# Patient Record
Sex: MALE | Race: BLACK OR AFRICAN AMERICAN | NOT HISPANIC OR LATINO | Employment: OTHER | URBAN - METROPOLITAN AREA
[De-identification: names, ages, dates, MRNs, and addresses within clinical notes are randomized per-mention and may not be internally consistent; named-entity substitution may affect disease eponyms.]

---

## 2017-03-08 ENCOUNTER — TRANSFERRED RECORDS (OUTPATIENT)
Dept: HEALTH INFORMATION MANAGEMENT | Facility: CLINIC | Age: 67
End: 2017-03-08

## 2017-03-16 ENCOUNTER — TRANSFERRED RECORDS (OUTPATIENT)
Dept: HEALTH INFORMATION MANAGEMENT | Facility: CLINIC | Age: 67
End: 2017-03-16

## 2017-03-20 ENCOUNTER — TRANSFERRED RECORDS (OUTPATIENT)
Dept: HEALTH INFORMATION MANAGEMENT | Facility: CLINIC | Age: 67
End: 2017-03-20

## 2017-08-29 ENCOUNTER — OFFICE VISIT (OUTPATIENT)
Dept: FAMILY MEDICINE | Facility: CLINIC | Age: 67
End: 2017-08-29
Payer: COMMERCIAL

## 2017-08-29 VITALS
DIASTOLIC BLOOD PRESSURE: 70 MMHG | HEIGHT: 70 IN | SYSTOLIC BLOOD PRESSURE: 129 MMHG | OXYGEN SATURATION: 98 % | WEIGHT: 187 LBS | BODY MASS INDEX: 26.77 KG/M2 | TEMPERATURE: 97.6 F | HEART RATE: 94 BPM

## 2017-08-29 DIAGNOSIS — Z00.00 ROUTINE GENERAL MEDICAL EXAMINATION AT A HEALTH CARE FACILITY: Primary | ICD-10-CM

## 2017-08-29 DIAGNOSIS — R21 RASH: ICD-10-CM

## 2017-08-29 DIAGNOSIS — I10 HYPERTENSION GOAL BP (BLOOD PRESSURE) < 140/90: ICD-10-CM

## 2017-08-29 DIAGNOSIS — B19.20 HEPATITIS C VIRUS INFECTION WITHOUT HEPATIC COMA, UNSPECIFIED CHRONICITY: ICD-10-CM

## 2017-08-29 LAB
ALBUMIN SERPL-MCNC: 3.6 G/DL (ref 3.4–5)
ALP SERPL-CCNC: 101 U/L (ref 40–150)
ALT SERPL W P-5'-P-CCNC: 24 U/L (ref 0–70)
ANION GAP SERPL CALCULATED.3IONS-SCNC: 8 MMOL/L (ref 3–14)
AST SERPL W P-5'-P-CCNC: 23 U/L (ref 0–45)
BILIRUB DIRECT SERPL-MCNC: <0.1 MG/DL (ref 0–0.2)
BILIRUB SERPL-MCNC: 0.3 MG/DL (ref 0.2–1.3)
BUN SERPL-MCNC: 13 MG/DL (ref 7–30)
CALCIUM SERPL-MCNC: 9.6 MG/DL (ref 8.5–10.1)
CHLORIDE SERPL-SCNC: 106 MMOL/L (ref 94–109)
CO2 SERPL-SCNC: 25 MMOL/L (ref 20–32)
CREAT SERPL-MCNC: 0.87 MG/DL (ref 0.66–1.25)
GFR SERPL CREATININE-BSD FRML MDRD: 87 ML/MIN/1.7M2
GLUCOSE SERPL-MCNC: 88 MG/DL (ref 70–99)
POTASSIUM SERPL-SCNC: 3.8 MMOL/L (ref 3.4–5.3)
PROT SERPL-MCNC: 7.7 G/DL (ref 6.8–8.8)
SODIUM SERPL-SCNC: 139 MMOL/L (ref 133–144)

## 2017-08-29 PROCEDURE — 80048 BASIC METABOLIC PNL TOTAL CA: CPT | Performed by: NURSE PRACTITIONER

## 2017-08-29 PROCEDURE — 99213 OFFICE O/P EST LOW 20 MIN: CPT | Mod: 25 | Performed by: NURSE PRACTITIONER

## 2017-08-29 PROCEDURE — 36415 COLL VENOUS BLD VENIPUNCTURE: CPT | Performed by: NURSE PRACTITIONER

## 2017-08-29 PROCEDURE — 87522 HEPATITIS C REVRS TRNSCRPJ: CPT | Performed by: NURSE PRACTITIONER

## 2017-08-29 PROCEDURE — 87529 HSV DNA AMP PROBE: CPT | Performed by: NURSE PRACTITIONER

## 2017-08-29 PROCEDURE — 86803 HEPATITIS C AB TEST: CPT | Performed by: NURSE PRACTITIONER

## 2017-08-29 PROCEDURE — G0438 PPPS, INITIAL VISIT: HCPCS | Performed by: NURSE PRACTITIONER

## 2017-08-29 PROCEDURE — 80076 HEPATIC FUNCTION PANEL: CPT | Performed by: NURSE PRACTITIONER

## 2017-08-29 PROCEDURE — 86706 HEP B SURFACE ANTIBODY: CPT | Performed by: NURSE PRACTITIONER

## 2017-08-29 PROCEDURE — 86704 HEP B CORE ANTIBODY TOTAL: CPT | Performed by: NURSE PRACTITIONER

## 2017-08-29 PROCEDURE — 87340 HEPATITIS B SURFACE AG IA: CPT | Performed by: NURSE PRACTITIONER

## 2017-08-29 RX ORDER — LISINOPRIL/HYDROCHLOROTHIAZIDE 10-12.5 MG
1 TABLET ORAL DAILY
Qty: 90 TABLET | Refills: 3 | Status: SHIPPED | OUTPATIENT
Start: 2017-08-29 | End: 2018-09-07

## 2017-08-29 ASSESSMENT — PAIN SCALES - GENERAL: PAINLEVEL: NO PAIN (0)

## 2017-08-29 NOTE — MR AVS SNAPSHOT
After Visit Summary   8/29/2017    Kapil Auguste    MRN: [id number]           Patient Information     Date Of Birth          1950        Visit Information        Provider Department      8/29/2017 10:20 AM Shadia Foote APRN [facility]        Today's Diagnoses     Routine general medical examination at a health care facility    -  1    Rash        Hypertension goal BP (blood pressure) < 140/90        Hepatitis C virus infection without hepatic coma, unspecified chronicity          Care Instructions      Preventive Health Recommendations:       Male Ages 65 and over    Yearly exam:             See your health care provider every year in order to  o   Review health changes.   o   Discuss preventive care.    o   Review your medicines if your doctor has prescribed any.    Talk with your health care provider about whether you should have a test to screen for prostate cancer (PSA).    Every 3 years, have a diabetes test (fasting glucose). If you are at risk for diabetes, you should have this test more often.    Every 5 years, have a cholesterol test. Have this test more often if you are at risk for high cholesterol or heart disease.     Every 10 years, have a colonoscopy. Or, have a yearly FIT test (stool test). These exams will check for colon cancer.    Talk to with your health care provider about screening for Abdominal Aortic Aneurysm if you have a family history of AAA or have a history of smoking.  Shots:     Get a flu shot each year.     Get a tetanus shot every 10 years.     Talk to your doctor about your pneumonia vaccines. There are now two you should receive - Pneumovax (PPSV 23) and Prevnar (PCV 13).    Talk to your doctor about a shingles vaccine.     Talk to your doctor about the hepatitis B vaccine.  Nutrition:     Eat at least 5 servings of fruits and vegetables each day.     Eat whole-grain bread, whole-wheat pasta and brown rice instead of  "white grains and rice.     Talk to your doctor about Calcium and Vitamin D.   Lifestyle    Exercise for at least 150 minutes a week (30 minutes a day, 5 days a week). This will help you control your weight and prevent disease.     Limit alcohol to one drink per day.     No smoking.     Wear sunscreen to prevent skin cancer.     See your dentist every six months for an exam and cleaning.     See your eye doctor every 1 to 2 years to screen for conditions such as glaucoma, macular degeneration and cataracts.          Follow-ups after your visit        Who to contact     If you have questions or need follow up information about today's clinic visit or your schedule please contact [facility] directly at [phone].  Normal or non-critical lab and imaging results will be communicated to you by MyChart, letter or phone within 4 business days after the clinic has received the results. If you do not hear from us within 7 days, please contact the clinic through [employer] or phone. If you have a critical or abnormal lab result, we will notify you by phone as soon as possible.  Submit refill requests through [employer] or call your pharmacy and they will forward the refill request to us. Please allow 3 business days for your refill to be completed.          Additional Information About Your Visit        [employer] ? Information     [employer] lets you send messages to your doctor, view your test results, renew your prescriptions, schedule appointments and more. To sign up, go to [email or web address] . Click on \"Log in\" on the left side of the screen, which will take you to the Welcome page. Then click on \"Sign up Now\" on the right side of the page.     You will be asked to enter the access code listed below, as well as some personal information. Please follow the directions to create your username and password.     Your access code is: [email or web address]  Expires: 2017 11:05 AM     Your access code will  " "in 90 days. If you need help or a new code, please call your [facility] clinic or [phone].        Care EveryWhere ID     This is your Care EveryWhere ID. This could be used by other organizations to access your [facility] medical records  [id number]        Your Vitals Were     Pulse Temperature Height Pulse Oximetry BMI (Body Mass Index)       94 97.6  F (36.4  C) (Oral) 5' 9.5\" (1.765 m) 98% 27.22 kg/m2        Blood Pressure from Last 3 Encounters:   08/29/17 129/70   09/26/11 140/100   09/19/11 153/90    Weight from Last 3 Encounters:   08/29/17 187 lb (84.8 kg)   09/26/11 181 lb (82.1 kg)   09/19/11 182 lb (82.6 kg)              We Performed the Following     Basic metabolic panel     Hepatitis B core antibody     Hepatitis B Surface Antibody     Hepatitis B surface antigen     Hepatitis C antibody     HSV 1 and 2 DNA by PCR          Where to get your medicines      These medications were sent to [facility] - [place], [place] - [address]  [address], [facility] [address]     Phone:  [phone]     lisinopril-hydrochlorothiazide 10-12.5 MG per tablet          Primary Care Provider Office Phone #    Cathie Fletcher [phone]       XX NO INFO FOUND XX  [place] [address]        Equal Access to Services     JOSE ANGEL LEBRON AH: Hadii aad ku hadasho Soomaali, waaxda luqadaha, qaybta kaalmada shakiregyada, etienne barillas. So [place] [phone].    ATENCIÓN: Si habla español, tiene a king disposición servicios gratuitos de asistencia lingüística. David al [phone].    We comply with applicable federal civil rights laws and [place] laws. We do not discriminate on the basis of race, color, national origin, age, disability sex, sexual orientation or gender identity.            Thank you!     Thank you for choosing [facility]  for your care. Our goal is always to provide you with excellent care. Hearing back from " our patients is one way we can continue to improve our services. Please take a few minutes to complete the written survey that you may receive in the mail after your visit with us. Thank you!             Your Updated Medication List - Protect others around you: Learn how to safely use, store and throw away your medicines at www.disposemymeds.org.          This list is accurate as of: 8/29/17 11:05 AM.  Always use your most recent med list.                   Brand Name Dispense Instructions for use Diagnosis    calcium-vitamin D 600-400 MG-UNIT per tablet    calcium 600 + D    100 tablet    Take 1 tablet by mouth 2 times daily.    Routine general medical examination at a health care facility       CLARITIN 10 MG capsule   Generic drug:  loratadine      Take 10 mg by mouth daily. As needed        ibuprofen 200 MG tablet    ADVIL/MOTRIN     Take 200 mg by mouth every 4 hours as needed. As needed        lisinopril-hydrochlorothiazide 10-12.5 MG per tablet    PRINZIDE/ZESTORETIC    90 tablet    Take 1 tablet by mouth daily    Hypertension goal BP (blood pressure) < 140/90       NYQUIL 60-7.5- MG/30ML Liqd   Generic drug:  Pseudoeph-Doxylamine-DM-APAP      Take  by mouth. As needed        order for DME     1 each    BP monitor device    Hypertension goal BP (blood pressure) < 140/90

## 2017-08-29 NOTE — Clinical Note
Please abstract the following data from this visit with this patient into the appropriate field in Epic: and please update HM and remove FIT testing. Thanks  Colonoscopy done on this date: 08/2013 (approximately), by this group: [facility], results were normal, repeat in 10 years.

## 2017-08-29 NOTE — Clinical Note
Please abstract the following data from this visit with this patient into the appropriate field in Epic:  Colonoscopy done on this date: 08/2013 (approximately), by this group: [facility], results were normal, repeat in 10 years.

## 2017-08-29 NOTE — PROGRESS NOTES
SUBJECTIVE:   Kapil Auguste is a 67 year old male who presents for Preventive Visit.      Are you in the first 12 months of your Medicare coverage?  No    Physical   Annual:     Getting at least 3 servings of Calcium per day::  Yes    Bi-annual eye exam::  Yes    Dental care twice a year::  Yes    Sleep apnea or symptoms of sleep apnea::  None    Diet::  Regular (no restrictions)    Frequency of exercise::  6-7 days/week    Duration of exercise::  Greater than 60 minutes    Taking medications regularly::  Yes    Medication side effects::  None    Additional concerns today::  No      COGNITIVE SCREEN  1) Repeat 3 items (Banana, Sunrise, Chair)    2) Clock draw: ABNORMAL   3) 3 item recall: Recalls 1 object   Results: ABNORMAL clock, 1-2 items recalled: PROBABLE COGNITIVE IMPAIRMENT, **INFORM PROVIDER**    Mini-CogTM Copyright S Yaakov. Licensed by the author for use in [facility]; reprinted with permission (summer@[facility]). All rights reserved.        Denies any concerns about his memory  No family history dementia    Wants testing for herpes  Reports he was seen at an outside clinic and prescribed over the counter treatment for athletes foot for a rash he had on his foot  The rash resolved  Declines other STD testing    Transferring from [facility]    Reports he had hep C in the . Never treated  Denies nausea, vomiting, fever, weight loss, night sweats    Exercises regularly and tolerates without chest pain, palpitations, SOB    Reviewed and updated as needed this visit by clinical staff      Reviewed and updated as needed this visit by Provider      Social History   Substance Use Topics     Smoking status: Former Smoker     Types: Cigarettes     Quit date: 1/26/2011     Smokeless tobacco: Never Used     Alcohol use Yes      Comment: occ.       The patient does not drink >3 drinks per day nor >7 drinks per week.      Today's PHQ-2 Score: PHQ-2 ( 1999 Pfizer) 8/29/2017   Q1:  Little interest or pleasure in doing things 0   Q2: Feeling down, depressed or hopeless 0   PHQ-2 Score 0   Q1: Little interest or pleasure in doing things Not at all   Q2: Feeling down, depressed or hopeless Not at all   PHQ-2 Score 0       Do you feel safe in your environment - Yes    Do you have a Health Care Directive?: Yes: Advance Directive has been received and scanned.    Current providers sharing in care for this patient include:   Patient Care Team:  Cathie Fletcher MD as PCP - General (Family Practice)      Hearing impairment: No    Ability to successfully perform activities of daily living: Yes, no assistance needed     Fall risk:  Fallen 2 or more times in the past year?: No  Any fall with injury in the past year?: No      Home safety:  lack of grab bars in the bathroom  click delete button to remove this line now    The following health maintenance items are reviewed in Epic and correct as of today:Health Maintenance   Topic Date Due     HEPATITIS C SCREENING  07/28/1966     MICROALBUMIN Q1 YEAR  09/26/2012     FIT Q1 YR  09/28/2012     FALL RISK ASSESSMENT  07/28/2013     AORTIC ANEURYSM SCREENING (SYSTEM ASSIGNED)  07/28/2013     PNEUMOCOCCAL (2 of 2 - PCV13) 05/08/2015     ADVANCE DIRECTIVE PLANNING Q5 YRS  09/19/2016     LIPID SCREEN Q5 YR MALE (SYSTEM ASSIGNED)  09/26/2016     INFLUENZA VACCINE (SYSTEM ASSIGNED)  09/01/2017     TETANUS IMMUNIZATION (SYSTEM ASSIGNED)  05/08/2024     BP Readings from Last 3 Encounters:   08/29/17 129/70   09/26/11 140/100   09/19/11 153/90    Wt Readings from Last 3 Encounters:   08/29/17 187 lb (84.8 kg)   09/26/11 181 lb (82.1 kg)   09/19/11 182 lb (82.6 kg)                  Patient Active Problem List   Diagnosis     Advanced directives, counseling/discussion     Adjustment disorder     CARDIOVASCULAR SCREENING; LDL GOAL LESS THAN 130     Hypertension goal BP (blood pressure) < 140/90     History reviewed. No pertinent surgical history.    Social History  "  Substance Use Topics     Smoking status: Current Some Day Smoker     Types: Cigarettes     Last attempt to quit: 1/26/2011     Smokeless tobacco: Never Used     Alcohol use Yes      Comment: occ.     Family History   Problem Relation Age of Onset     DIABETES Mother      Hypertension Mother      HEART DISEASE Father      Hypertension Father      CANCER Paternal Uncle      pamcreatic           ROS:  Constitutional, HEENT, cardiovascular, pulmonary, GI, , musculoskeletal, neuro, skin, endocrine and psych systems are negative, except as otherwise noted.      OBJECTIVE:   /70 (BP Location: Right arm, Patient Position: Chair, Cuff Size: Adult Regular)  Pulse 94  Temp 97.6  F (36.4  C) (Oral)  Ht 5' 9.5\" (1.765 m)  Wt 187 lb (84.8 kg)  SpO2 98%  BMI 27.22 kg/m2 Estimated body mass index is 26.16 kg/(m^2) as calculated from the following:    Height as of 9/26/11: 5' 9.75\" (1.772 m).    Weight as of 9/26/11: 181 lb (82.1 kg).  EXAM:   GENERAL: healthy, alert and no distress  EYES: Eyes grossly normal to inspection, PERRL and conjunctivae and sclerae normal  HENT: ear canals and TM's normal, nose and mouth without ulcers or lesions  NECK: no adenopathy, no asymmetry, masses, or scars and thyroid normal to palpation  RESP: lungs clear to auscultation - no rales, rhonchi or wheezes  CV: regular rate and rhythm, normal S1 S2, no S3 or S4, no murmur, click or rub, no peripheral edema and peripheral pulses strong  ABDOMEN: soft, nontender, no hepatosplenomegaly, no masses and bowel sounds normal  MS: no gross musculoskeletal defects noted, no edema  SKIN: no suspicious lesions or rashes  NEURO: Normal strength and tone, mentation intact and speech normal  PSYCH: mentation appears normal, affect normal/bright    ASSESSMENT / PLAN:   1. Routine general medical examination at a health care facility    2. Rash  - Rash has resolved. Will do blood test for HSV. He declined further STD testing. He denies any known " "exposure to HSV  - HSV 1 and 2 DNA by PCR    3. Hypertension goal BP (blood pressure) < 140/90  - Well controlled. Continue with current medication  - lisinopril-hydrochlorothiazide (PRINZIDE/ZESTORETIC) 10-12.5 MG per tablet; Take 1 tablet by mouth daily  Dispense: 90 tablet; Refill: 3  - Basic metabolic panel    4. Hepatitis C virus infection without hepatic coma, unspecified chronicity  - hepatic panel  - Hepatitis C antibody  - Hepatitis B Surface Antibody  - Hepatitis B core antibody  - Hepatitis B surface antigen  - Follow up pending lab results. May need refer to GI    End of Life Planning:  Patient currently has an advanced directive: Yes.  Practitioner is supportive of decision.    COUNSELING:  Reviewed preventive health counseling, as reflected in patient instructions       Regular exercise       Healthy diet/nutrition       Vision screening       Safe sex practices/STD prevention       Colon cancer screening       Prostate cancer screening        Estimated body mass index is 26.16 kg/(m^2) as calculated from the following:    Height as of 9/26/11: 5' 9.75\" (1.772 m).    Weight as of 9/26/11: 181 lb (82.1 kg).     reports that he quit smoking about 6 years ago. His smoking use included Cigarettes. He has never used smokeless tobacco.        Appropriate preventive services were discussed with this patient, including applicable screening as appropriate for cardiovascular disease, diabetes, osteopenia/osteoporosis, and glaucoma.  As appropriate for age/gender, discussed screening for colorectal cancer, prostate cancer, breast cancer, and cervical cancer. Checklist reviewing preventive services available has been given to the patient.    Reviewed patients plan of care and provided an AVS. The Basic Care Plan (routine screening as documented in Health Maintenance) for Kapil meets the Care Plan requirement. This Care Plan has been established and reviewed with the Patient.    Counseling Resources:  ATP IV " Guidelines  Pooled Cohorts Equation Calculator  Breast Cancer Risk Calculator  FRAX Risk Assessment  ICSI Preventive Guidelines  Dietary Guidelines for Americans, 2010  USDA's MyPlate  ASA Prophylaxis  Lung CA Screening    LEV Jim CNP  [facility] for HPI/ROS submitted by the patient on 8/29/2017   PHQ-2 Score: 0    Please abstract the following data from this visit with this patient into the appropriate field in Epic:    Colonoscopy done on this date: 08/2013 (approximately), by this group: [facility], results were normal, repeat in 10 years.

## 2017-08-29 NOTE — NURSING NOTE
"Chief Complaint   Patient presents with     Physical       Initial /70 (BP Location: Right arm, Patient Position: Chair, Cuff Size: Adult Regular)  Pulse 94  Temp 97.6  F (36.4  C) (Oral)  Ht 5' 9.5\" (1.765 m)  Wt 187 lb (84.8 kg)  SpO2 98%  BMI 27.22 kg/m2 Estimated body mass index is 27.22 kg/(m^2) as calculated from the following:    Height as of this encounter: 5' 9.5\" (1.765 m).    Weight as of this encounter: 187 lb (84.8 kg).  Medication Reconciliation: complete.  SANDIP Burden MA      "

## 2017-08-30 LAB
HBV CORE AB SERPL QL IA: REACTIVE
HBV SURFACE AB SERPL IA-ACNC: 267.02 M[IU]/ML
HBV SURFACE AG SERPL QL IA: NONREACTIVE
HCV AB SERPL QL IA: REACTIVE
HSV1 DNA SPEC QL NAA+PROBE: NEGATIVE
HSV2 DNA SPEC QL NAA+PROBE: NEGATIVE
SPECIMEN SOURCE: NORMAL

## 2017-09-01 LAB
HCV RNA SERPL NAA+PROBE-ACNC: NORMAL [IU]/ML
HCV RNA SERPL NAA+PROBE-LOG IU: NORMAL LOG IU/ML

## 2017-09-01 NOTE — PROGRESS NOTES
Kapil Auguste,    Your lab results have been released to [facility].    Your hepatitis C antibody is positive but the RNA test is negative. This means your past infection with hepatitis C resolved, or it was a false positive. So you don't have hepatitis C.  Your blood work also shows that you likely had hepatitis B in the past, and that has resolved as well.   Testing for herpes is negative.  Your other labs look good.     Please call the clinic if you have any concerns [phone].    LEV Jim [facility]

## 2018-01-03 ENCOUNTER — OFFICE VISIT (OUTPATIENT)
Dept: FAMILY MEDICINE | Facility: CLINIC | Age: 68
End: 2018-01-03
Payer: COMMERCIAL

## 2018-01-03 VITALS
OXYGEN SATURATION: 97 % | TEMPERATURE: 96.9 F | HEART RATE: 94 BPM | BODY MASS INDEX: 27.07 KG/M2 | WEIGHT: 186 LBS | DIASTOLIC BLOOD PRESSURE: 78 MMHG | SYSTOLIC BLOOD PRESSURE: 128 MMHG

## 2018-01-03 DIAGNOSIS — L30.9 DERMATITIS: ICD-10-CM

## 2018-01-03 DIAGNOSIS — I10 HYPERTENSION GOAL BP (BLOOD PRESSURE) < 140/90: ICD-10-CM

## 2018-01-03 DIAGNOSIS — K21.9 GASTROESOPHAGEAL REFLUX DISEASE, ESOPHAGITIS PRESENCE NOT SPECIFIED: Primary | ICD-10-CM

## 2018-01-03 PROCEDURE — 99213 OFFICE O/P EST LOW 20 MIN: CPT | Performed by: NURSE PRACTITIONER

## 2018-01-03 RX ORDER — TRIAMCINOLONE ACETONIDE 1 MG/G
CREAM TOPICAL
Qty: 30 G | Refills: 1 | Status: SHIPPED | OUTPATIENT
Start: 2018-01-03 | End: 2018-09-07

## 2018-01-03 ASSESSMENT — PAIN SCALES - GENERAL: PAINLEVEL: NO PAIN (0)

## 2018-01-03 NOTE — MR AVS SNAPSHOT
After Visit Summary   1/3/2018    Kapil Auguste    MRN: [id number]           Patient Information     Date Of Birth          1950        Visit Information        Provider Department      1/3/2018 8:40 AM Shadia Foote APRN [facility]        Today's Diagnoses     Gastroesophageal reflux disease, esophagitis presence not specified    -  1    Dermatitis          Care Instructions      Tips to Control Acid Reflux    To control acid reflux, you ll need to make some basic diet and lifestyle changes. The simple steps outlined below may be all you ll need to ease discomfort.  Watch what you eat    Avoid fatty foods and spicy foods.    Eat fewer acidic foods, such as citrus and tomato-based foods. These can increase symptoms.    Limit drinking alcohol, caffeine, and fizzy beverages. All increase acid reflux.    Try limiting chocolate, peppermint, and spearmint. These can worsen acid reflux in some people.  Watch when you eat    Avoid lying down for 3 hours after eating.    Do not snack before going to bed.  Raise your head  Raising your head and upper body by 4 to 6 inches helps limit reflux when you re lying down. Put blocks under the head of your bed frame to raise it.  Other changes    Lose weight, if you need to    Don t exercise near bedtime    Avoid tight-fitting clothes    Limit aspirin and ibuprofen    Stop smoking   Date Last Reviewed: 7/1/2016 2000-2017 [employer]. [address] [place] [address], [place] [address]. All rights reserved. This information is not intended as a substitute for professional medical care. Always follow your healthcare professional's instructions.        Lifestyle Changes for Controlling GERD  When you have GERD, stomach acid feels as if it s backing up toward your mouth. Whether or not you take medicine to control your GERD, your symptoms can often be improved with lifestyle changes. Talk to your healthcare provider  about the following suggestions. These suggestions may help you get relief from your symptoms.      Raise your head  Reflux is more likely to strike when you re lying down flat, because stomach fluid can flow backward more easily. Raising the head of your bed 4 to 6 inches can help. To do this:    Slide blocks or books under the legs at the head of your bed. Or, place a wedge under the mattress. Many foam stores can make a suitable wedge for you. The wedge should run from your waist to the top of your head.    Don t just prop your head on several pillows. This increases pressure on your stomach. It can make GERD worse.  Watch your eating habits  Certain foods may increase the acid in your stomach or relax the lower esophageal sphincter. This makes GERD more likely. It s best to avoid the following if they cause you symptoms:    Coffee, tea, and carbonated drinks (with and without caffeine)    Fatty, fried, or spicy food    Mint, chocolate, onions, and tomatoes    Peppermint    Any other foods that seem to irritate your stomach or cause you pain  Relieve the pressure  Tips include the following:    Eat smaller meals, even if you have to eat more often.    Don t lie down right after you eat. Wait a few hours for your stomach to empty.    Avoid tight belts and tight-fitting clothes.    Lose excess weight.  Tobacco and alcohol  Avoid smoking tobacco and drinking alcohol. They can make GERD symptoms worse.  Date Last Reviewed: 7/1/2016 2000-2017 [employer]. [address] [place] [address], [place] [address]. All rights reserved. This information is not intended as a substitute for professional medical care. Always follow your healthcare professional's instructions.                Follow-ups after your visit        Who to contact     If you have questions or need follow up information about today's clinic visit or your schedule please contact [facility] directly at [phone].  Normal or  non-critical lab and imaging results will be communicated to you by MyChart, letter or phone within 4 business days after the clinic has received the results. If you do not hear from us within 7 days, please contact the clinic through [employer] or phone. If you have a critical or abnormal lab result, we will notify you by phone as soon as possible.  Submit refill requests through [employer] IO or call your pharmacy and they will forward the refill request to us. Please allow 3 business days for your refill to be completed.          Additional Information About Your Visit        FAD ? IO Information     [employer] IO gives you secure access to your electronic health record. If you see a primary care provider, you can also send messages to your care team and make appointments. If you have questions, please call your primary care clinic.  If you do not have a primary care provider, please call [phone] and they will assist you.        Care EveryWhere ID     This is your Care EveryWhere ID. This could be used by other organizations to access your [facility] medical records  [id number]        Your Vitals Were     Pulse Temperature Pulse Oximetry BMI (Body Mass Index)          94 96.9  F (36.1  C) (Oral) 97% 27.07 kg/m2         Blood Pressure from Last 3 Encounters:   01/03/18 128/78   08/29/17 129/70   09/26/11 140/100    Weight from Last 3 Encounters:   01/03/18 186 lb (84.4 kg)   08/29/17 187 lb (84.8 kg)   09/26/11 181 lb (82.1 kg)              Today, you had the following     No orders found for display         Today's Medication Changes          These changes are accurate as of: 1/3/18  9:08 AM.  If you have any questions, ask your nurse or doctor.               Start taking these medicines.        Dose/Directions    ranitidine 150 MG tablet   Commonly known as:  ZANTAC   Used for:  Gastroesophageal reflux disease, esophagitis presence not specified   Started by:  Shadia Foote APRN CNP        Dose:  150 mg   Take 1  tablet (150 mg) by mouth 2 times daily   Quantity:  60 tablet   Refills:  3       triamcinolone 0.1 % cream   Commonly known as:  KENALOG   Used for:  Dermatitis   Started by:  Shadia Foote APRN CNP        Apply sparingly to affected area three times daily for 14 days.   Quantity:  30 g   Refills:  1            Where to get your medicines      These medications were sent to [facility] - [place], [place] - [address]  [address], [facility] [address]     Phone:  [phone]     ranitidine 150 MG tablet    triamcinolone 0.1 % cream                Primary Care Provider Office Phone # Fax #    LEV Knight [phone]       [address]  [facility] [address]        Equal Access to Services     JOSE ANGEL LEBRON : Hadii josias maxwell hadasho Soomaali, waaxda luqadaha, qaybta kaalmada adeegyada, waxpako wu . So [facility] [phone].    ATENCIÓN: Si habla español, tiene a king disposición servicios gratuitos de asistencia lingüística. Llame al [phone].    We comply with applicable federal civil rights laws and [place] laws. We do not discriminate on the basis of race, color, national origin, age, disability, sex, sexual orientation, or gender identity.            Thank you!     Thank you for choosing [facility]  for your care. Our goal is always to provide you with excellent care. Hearing back from our patients is one way we can continue to improve our services. Please take a few minutes to complete the written survey that you may receive in the mail after your visit with us. Thank you!             Your Updated Medication List - Protect others around you: Learn how to safely use, store and throw away your medicines at www.disposemymeds.org.          This list is accurate as of: 1/3/18  9:08 AM.  Always use your most recent med list.                   Brand Name Dispense  Instructions for use Diagnosis    calcium-vitamin D 600-400 MG-UNIT per tablet    calcium 600 + D    100 tablet    Take 1 tablet by mouth 2 times daily.    Routine general medical examination at a health care facility       CLARITIN 10 MG capsule   Generic drug:  loratadine      Take 10 mg by mouth daily. As needed        ibuprofen 200 MG tablet    ADVIL/MOTRIN     Take 200 mg by mouth every 4 hours as needed. As needed        lisinopril-hydrochlorothiazide 10-12.5 MG per tablet    PRINZIDE/ZESTORETIC    90 tablet    Take 1 tablet by mouth daily    Hypertension goal BP (blood pressure) < 140/90       NYQUIL 60-7.5- MG/30ML Liqd   Generic drug:  Pseudoeph-Doxylamine-DM-APAP      Take  by mouth. As needed        order for DME     1 each    BP monitor device    Hypertension goal BP (blood pressure) < 140/90       ranitidine 150 MG tablet    ZANTAC    60 tablet    Take 1 tablet (150 mg) by mouth 2 times daily    Gastroesophageal reflux disease, esophagitis presence not specified       triamcinolone 0.1 % cream    KENALOG    30 g    Apply sparingly to affected area three times daily for 14 days.    Dermatitis

## 2018-01-03 NOTE — PROGRESS NOTES
SUBJECTIVE:   Kapil Auguste is a 67 year old male who presents to clinic today for the following health issues:    Abdominal Pain      Duration: 1 week ago    Description (location/character/radiation): lower abdomen- happens at night       Associated flank pain: left side, intermittent pain- worse at night    Intensity:  2/10    Accompanying signs and symptoms:        Fever/Chills: no        Gas/Bloating: YES- both       Nausea/vomitting: YES       Diarrhea: no        Dysuria or Hematuria: no     History (previous similar pain/trauma/previous testing): Yes- Visits from [facility]    Precipitating or alleviating factors:       Pain worse with eating/BM/urination: None       Pain relieved by BM: no     Therapies tried and outcome: Zantac- has helped some    LMP:  not applicable    Needs refill on Triamcincolone cream.    Matilde Restrepo MA    He had endoscopy March 2017 through [facility]  Done for red blood per rectum  Showed hiatal hernia and esophagitis  Started on omeprazole which he was on for 30 days  Switched to ranitidine  Symptoms well controlled on ranitidine. Only took for 10 days. Stopped and no return of symptoms  Approximately 1 week ago developed heart burn, nausea, vomiting  He took 1 tablet Zantac yesterday which helped relieve his symptoms  No fevers, hemoptysis  Quit smoking 3 days ago  5 pound intended weight loss over past month (diet and exercise)        Problem list and histories reviewed & adjusted, as indicated.  Additional history: none    Patient Active Problem List   Diagnosis     Advanced directives, counseling/discussion     Adjustment disorder     CARDIOVASCULAR SCREENING; LDL GOAL LESS THAN 130     Hypertension goal BP (blood pressure) < 140/90     History reviewed. No pertinent surgical history.    Social History   Substance Use Topics     Smoking status: Former Smoker     Types: Cigarettes     Quit date: 1/1/2018     Smokeless tobacco: Never Used     Alcohol use Yes      Comment:  occ.     Family History   Problem Relation Age of Onset     DIABETES Mother      Hypertension Mother      HEART DISEASE Father      Hypertension Father      CANCER Paternal Uncle      pamcreatic             Reviewed and updated as needed this visit by clinical staff     Reviewed and updated as needed this visit by Provider         ROS:  Constitutional, HEENT, cardiovascular, pulmonary, gi and gu systems are negative, except as otherwise noted.      OBJECTIVE:   /78  Pulse 94  Temp 96.9  F (36.1  C) (Oral)  Wt 186 lb (84.4 kg)  SpO2 97%  BMI 27.07 kg/m2  Body mass index is 27.07 kg/(m^2).  GENERAL: healthy, alert and no distress  RESP: lungs clear to auscultation - no rales, rhonchi or wheezes  CV: regular rate and rhythm, normal S1 S2, no S3 or S4, no murmur, click or rub, no peripheral edema and peripheral pulses strong  ABDOMEN: soft, nontender, no hepatosplenomegaly, no masses and bowel sounds normal    Diagnostic Test Results:  none     ASSESSMENT/PLAN:       ICD-10-CM    1. Gastroesophageal reflux disease, esophagitis presence not specified K21.9 ranitidine (ZANTAC) 150 MG tablet   2. Dermatitis L30.9 triamcinolone (KENALOG) 0.1 % cream   3. Hypertension goal BP (blood pressure) < 140/90 I10      Past history of GERD with esophagitis. Patient had similar symptoms with N/V in past and responded well to H2RA. Will restart. If unable to control symptoms, rule out H pylori and switch to PPI  Discussed lifestyle modifications and handout given  BP improved upon recheck  Discussed smoking cessations, he declined additional medications/resources  See Patient Instructions    [facility]

## 2018-01-03 NOTE — NURSING NOTE
"Chief Complaint   Patient presents with     Abdominal Pain       Initial BP (!) 136/92 (BP Location: Right arm, Patient Position: Chair, Cuff Size: Adult Small)  Pulse 94  Temp 96.9  F (36.1  C) (Oral)  Wt 186 lb (84.4 kg)  SpO2 97%  BMI 27.07 kg/m2 Estimated body mass index is 27.07 kg/(m^2) as calculated from the following:    Height as of 8/29/17: 5' 9.5\" (1.765 m).    Weight as of this encounter: 186 lb (84.4 kg).  Medication Reconciliation: complete     Matilde Restrepo MA      "

## 2018-01-03 NOTE — PATIENT INSTRUCTIONS
Tips to Control Acid Reflux    To control acid reflux, you ll need to make some basic diet and lifestyle changes. The simple steps outlined below may be all you ll need to ease discomfort.  Watch what you eat    Avoid fatty foods and spicy foods.    Eat fewer acidic foods, such as citrus and tomato-based foods. These can increase symptoms.    Limit drinking alcohol, caffeine, and fizzy beverages. All increase acid reflux.    Try limiting chocolate, peppermint, and spearmint. These can worsen acid reflux in some people.  Watch when you eat    Avoid lying down for 3 hours after eating.    Do not snack before going to bed.  Raise your head  Raising your head and upper body by 4 to 6 inches helps limit reflux when you re lying down. Put blocks under the head of your bed frame to raise it.  Other changes    Lose weight, if you need to    Don t exercise near bedtime    Avoid tight-fitting clothes    Limit aspirin and ibuprofen    Stop smoking   Date Last Reviewed: 7/1/2016 2000-2017 [employer]. [address] [place] [address], [place] [id number]. All rights reserved. This information is not intended as a substitute for professional medical care. Always follow your healthcare professional's instructions.        Lifestyle Changes for Controlling GERD  When you have GERD, stomach acid feels as if it s backing up toward your mouth. Whether or not you take medicine to control your GERD, your symptoms can often be improved with lifestyle changes. Talk to your healthcare provider about the following suggestions. These suggestions may help you get relief from your symptoms.      Raise your head  Reflux is more likely to strike when you re lying down flat, because stomach fluid can flow backward more easily. Raising the head of your bed 4 to 6 inches can help. To do this:    Slide blocks or books under the legs at the head of your bed. Or, place a wedge under the mattress. Many [employer] stores can make a suitable wedge for  you. The wedge should run from your waist to the top of your head.    Don t just prop your head on several pillows. This increases pressure on your stomach. It can make GERD worse.  Watch your eating habits  Certain foods may increase the acid in your stomach or relax the lower esophageal sphincter. This makes GERD more likely. It s best to avoid the following if they cause you symptoms:    Coffee, tea, and carbonated drinks (with and without caffeine)    Fatty, fried, or spicy food    Mint, chocolate, onions, and tomatoes    Peppermint    Any other foods that seem to irritate your stomach or cause you pain  Relieve the pressure  Tips include the following:    Eat smaller meals, even if you have to eat more often.    Don t lie down right after you eat. Wait a few hours for your stomach to empty.    Avoid tight belts and tight-fitting clothes.    Lose excess weight.  Tobacco and alcohol  Avoid smoking tobacco and drinking alcohol. They can make GERD symptoms worse.  Date Last Reviewed: 7/1/2016 2000-2017 [employer]. [address] [place] [address], [place] [address]. All rights reserved. This information is not intended as a substitute for professional medical care. Always follow your healthcare professional's instructions.

## 2018-01-09 ENCOUNTER — TRANSFERRED RECORDS (OUTPATIENT)
Dept: HEALTH INFORMATION MANAGEMENT | Facility: CLINIC | Age: 68
End: 2018-01-09

## 2018-03-16 ENCOUNTER — TELEPHONE (OUTPATIENT)
Dept: FAMILY MEDICINE | Facility: CLINIC | Age: 68
End: 2018-03-16

## 2018-03-16 NOTE — TELEPHONE ENCOUNTER
Reason for Call:  Form, our goal is to have forms completed with 72 hours, however, some forms may require a visit or additional information.    Type of letter, form or note:  [employer] Assistance Program    Who is the form from?: Patient    Where did the form come from: Patient or family brought in       What clinic location was the form placed at?: [facility])    Where the form was placed: 's Box    What number is listed as a contact on the form?: [phone]       Additional comments: Please call the patient when the form is complete and he will  at the .    Call taken on 3/16/2018 at 10:07 AM by Kathleen Key

## 2018-03-26 NOTE — TELEPHONE ENCOUNTER
Patient needs to be seen in clinic. No documentation of erectile dysfunction in [facility] and I don't see any previous scripts for sildenafil (Viagra)

## 2018-03-26 NOTE — TELEPHONE ENCOUNTER
TC contacted patient and communicated message below. Patient states that medication was previously prescribed by a provider in [facility]. He asked that forms be mailed to his home address and he will contact that provider to complete. Forms mailed as requested.

## 2018-04-06 ENCOUNTER — OFFICE VISIT (OUTPATIENT)
Dept: FAMILY MEDICINE | Facility: CLINIC | Age: 68
End: 2018-04-06
Payer: COMMERCIAL

## 2018-04-06 VITALS
BODY MASS INDEX: 26.31 KG/M2 | SYSTOLIC BLOOD PRESSURE: 112 MMHG | WEIGHT: 180.75 LBS | TEMPERATURE: 97.4 F | DIASTOLIC BLOOD PRESSURE: 68 MMHG | HEART RATE: 82 BPM

## 2018-04-06 DIAGNOSIS — F51.01 PRIMARY INSOMNIA: Primary | ICD-10-CM

## 2018-04-06 DIAGNOSIS — B00.1 COLD SORE: ICD-10-CM

## 2018-04-06 PROCEDURE — 99213 OFFICE O/P EST LOW 20 MIN: CPT | Performed by: NURSE PRACTITIONER

## 2018-04-06 RX ORDER — CHLORAL HYDRATE 500 MG
1 CAPSULE ORAL DAILY
COMMUNITY
End: 2019-03-11

## 2018-04-06 RX ORDER — VALACYCLOVIR HYDROCHLORIDE 1 G/1
2000 TABLET, FILM COATED ORAL 2 TIMES DAILY
Qty: 4 TABLET | Refills: 0 | Status: SHIPPED | OUTPATIENT
Start: 2018-04-06

## 2018-04-06 RX ORDER — TRAZODONE HYDROCHLORIDE 50 MG/1
50-150 TABLET, FILM COATED ORAL
Qty: 60 TABLET | Refills: 1 | Status: SHIPPED | OUTPATIENT
Start: 2018-04-06 | End: 2018-09-07

## 2018-04-06 NOTE — MR AVS SNAPSHOT
After Visit Summary   4/6/2018    Kapil Auguste    MRN: [id number]           Patient Information     Date Of Birth          1950        Visit Information        Provider Department      4/6/2018 1:20 PM Shadia Foote APRN CNP [facility]        Today's Diagnoses     Primary insomnia    -  1      Care Instructions    You can take 1-3 tablets Trazodone 30 minutes before bedtime  Make sure you have at least 8 hours before you need to get up, otherwise you may wake up drowsy    No electronic devices in the bedroom  Avoid electronic devices 30-60 minutes before bed  If you wake up during the night, do not lay in bed for more than 20 minutes. If you can't fall asleep, go out of the bedroom but again, avoid electronic devices    Call the clinic if you don't notice any improvement, even at 150 mg/day for a few days and we can try a different medication    You can continue taking melatonin. It will signal to your brain to get ready for bed, but it won't make you drowsy          Follow-ups after your visit        Who to contact     If you have questions or need follow up information about today's clinic visit or your schedule please contact [facility] directly at [phone].  Normal or non-critical lab and imaging results will be communicated to you by MyChart, letter or phone within 4 business days after the clinic has received the results. If you do not hear from us within 7 days, please contact the clinic through [employer] or phone. If you have a critical or abnormal lab result, we will notify you by phone as soon as possible.  Submit refill requests through [employer] or call your pharmacy and they will forward the refill request to us. Please allow 3 business days for your refill to be completed.          Additional Information About Your Visit        [employer] Information     [employer] gives you secure access to your electronic health record. If you  see a primary care provider, you can also send messages to your care team and make appointments. If you have questions, please call your primary care clinic.  If you do not have a primary care provider, please call [phone] and they will assist you.        Care EveryWhere ID     This is your Care EveryWhere ID. This could be used by other organizations to access your [facility] medical records  [id number]        Your Vitals Were     Pulse Temperature BMI (Body Mass Index)             82 97.4  F (36.3  C) (Oral) 26.31 kg/m2          Blood Pressure from Last 3 Encounters:   04/06/18 112/68   01/03/18 128/78   08/29/17 129/70    Weight from Last 3 Encounters:   04/06/18 180 lb 12 oz (82 kg)   01/03/18 186 lb (84.4 kg)   08/29/17 187 lb (84.8 kg)              Today, you had the following     No orders found for display         Today's Medication Changes          These changes are accurate as of 4/6/18  2:12 PM.  If you have any questions, ask your nurse or doctor.               Start taking these medicines.        Dose/Directions    traZODone 50 MG tablet   Commonly known as:  DESYREL   Used for:  Primary insomnia   Started by:  Shadia Foote APRN CNP        Dose:   mg   Take 1-3 tablets ( mg) by mouth nightly as needed for sleep   Quantity:  60 tablet   Refills:  1            Where to get your medicines      These medications were sent to [facility] [place], [place] - [address]  [address] [facility] [address]     Phone:  [phone]     traZODone 50 MG tablet                Primary Care Provider Office Phone # Fax #    LEV Knight [phone]       [address] [facility] [address]        Equal Access to Services     JOSE ANGEL LEBRON : Elaine ayers Sohermilo, waaxda luqadaha, qaybta kaalmada aleksyaismael, etienne barillas. So [facility] [phone].    ATENCIÓN: Si  ernst carpenter, tiene a king disposición servicios gratuitos de asistencia lingüística. David lewis [phone].    We comply with applicable federal civil rights laws and [place] laws. We do not discriminate on the basis of race, color, national origin, age, disability, sex, sexual orientation, or gender identity.            Thank you!     Thank you for choosing [facility]  for your care. Our goal is always to provide you with excellent care. Hearing back from our patients is one way we can continue to improve our services. Please take a few minutes to complete the written survey that you may receive in the mail after your visit with us. Thank you!             Your Updated Medication List - Protect others around you: Learn how to safely use, store and throw away your medicines at www.disposemymeds.org.          This list is accurate as of 4/6/18  2:12 PM.  Always use your most recent med list.                   Brand Name Dispense Instructions for use Diagnosis    CLARITIN 10 MG capsule   Generic drug:  loratadine      Take 10 mg by mouth daily. As needed        fish oil-omega-3 fatty acids 1000 MG capsule      Take 1 g by mouth daily        lisinopril-hydrochlorothiazide 10-12.5 MG per tablet    PRINZIDE/ZESTORETIC    90 tablet    Take 1 tablet by mouth daily    Hypertension goal BP (blood pressure) < 140/90       Lysine 500 MG Tabs      Take 1 tablet by mouth daily        order for DME     1 each    BP monitor device    Hypertension goal BP (blood pressure) < 140/90       ranitidine 150 MG tablet    ZANTAC    60 tablet    Take 1 tablet (150 mg) by mouth 2 times daily    Gastroesophageal reflux disease, esophagitis presence not specified       traZODone 50 MG tablet    DESYREL    60 tablet    Take 1-3 tablets ( mg) by mouth nightly as needed for sleep    Primary insomnia       triamcinolone 0.1 % cream    KENALOG    30 g    Apply sparingly to affected area three times daily for 14 days.     Dermatitis

## 2018-04-06 NOTE — PATIENT INSTRUCTIONS
You can take 1-3 tablets Trazodone 30 minutes before bedtime  Make sure you have at least 8 hours before you need to get up, otherwise you may wake up drowsy    No electronic devices in the bedroom  Avoid electronic devices 30-60 minutes before bed  If you wake up during the night, do not lay in bed for more than 20 minutes. If you can't fall asleep, go out of the bedroom but again, avoid electronic devices    Call the clinic if you don't notice any improvement, even at 150 mg/day for a few days and we can try a different medication    You can continue taking melatonin. It will signal to your brain to get ready for bed, but it won't make you drowsy

## 2018-04-06 NOTE — PROGRESS NOTES
SUBJECTIVE:   Kapil Auguste is a 67 year old male who presents to clinic today for the following health issues:      Insomnia  Onset: 2 weeks    Description:   Time to fall asleep (sleep latency): 1 hour  Middle of night awakening:  YES  Early morning awakening:  YES- 3-4 hours before needs to be up    Progression of Symptoms:  worsening    Accompanying Signs & Symptoms:  Daytime sleepiness/napping: YES  Excessive snoring/apnea: YES  Restless legs: no   Frequent urination: no   Chronic pain:  no     History:  Prior Insomnia: YES    Precipitating factors:   New stressful situation: YES- going back to work , quit smoking  Caffeine intake: no  OTC decongestants: no   Any new medications: no     Alleviating factors:  Self medicating (alcohol, etc.):  no    Therapies Tried and outcome: Melatonin, Somnapure, with no relief    Started new job March 17    Variable hours, occasionally overnight  He likes the work but hoping that hours will become more regular  Quit smoking March 21  Tried patch, lozenges, didn't like the way it made him feel so he stopped  He has cut back on alcohol intake  Previously 4 shots, 3 nights a week  He has cut back. Only 1 shot in past 2 weeks  Thought important to reduce alcohol at same time as quitting smoking    Drinks 12 ounces coffee in the morning  None during the day    Sleep latency 1 hour  Wakes up twice a night. Usually to go to the bathroom, then can't fall back asleep  Sleeping 4-5 hours/night  He has recorded his sleep some snoring  No known family history of sleep apnea  Feels tired when waking up in the morning          Problem list and histories reviewed & adjusted, as indicated.  Additional history: none    Patient Active Problem List   Diagnosis     Advanced directives, counseling/discussion     Adjustment disorder     CARDIOVASCULAR SCREENING; LDL GOAL LESS THAN 130     Hypertension goal BP (blood pressure) < 140/90     History reviewed. No pertinent surgical  history.    Social History   Substance Use Topics     Smoking status: Former Smoker     Types: Cigarettes     Quit date: 3/20/2018     Smokeless tobacco: Never Used     Alcohol use Yes      Comment: occ.     Family History   Problem Relation Age of Onset     DIABETES Mother      Hypertension Mother      HEART DISEASE Father      Hypertension Father      CANCER Paternal Uncle      pamcreatic           Reviewed and updated as needed this visit by clinical staff  Tobacco  Allergies  Med Hx  Surg Hx  Fam Hx  Soc Hx      Reviewed and updated as needed this visit by Provider         ROS:  Constitutional, HEENT, cardiovascular, pulmonary, gi and gu systems are negative, except as otherwise noted.    OBJECTIVE:     /68 (BP Location: Left arm, Patient Position: Sitting, Cuff Size: Adult Regular)  Pulse 82  Temp 97.4  F (36.3  C) (Oral)  Wt 180 lb 12 oz (82 kg)  BMI 26.31 kg/m2  Body mass index is 26.31 kg/(m^2).  GENERAL: healthy, alert and no distress  RESP: lungs clear to auscultation - no rales, rhonchi or wheezes  CV: regular rate and rhythm, normal S1 S2, no S3 or S4, no murmur, click or rub, no peripheral edema and peripheral pulses strong  PSYCH: mentation appears normal, affect normal/bright  SKIN: Small scaly ulceration left lower lip    Diagnostic Test Results:  none     ASSESSMENT/PLAN:       ICD-10-CM    1. Primary insomnia F51.01 traZODone (DESYREL) 50 MG tablet   2. Cold sore B00.1 valACYclovir (VALTREX) 1000 mg tablet     Discussed sleep hygiene strategies  Discussed use of short term medication for insomnia. Ok for use 6-8 weeks with goal of weaning off around that time  Insomnia likely related to change in stress, sleep schedule, tobacco cessation and reduction of alcohol intake  Applauded him on these positive changes  Reviewed risks and benefits of Trazodone  Start at 50 mg and can increase to 150 mg as needed  If no improvement in sleep despite max dose x3-4 days, call clinic and I will  prescribe different agent    Patient Instructions   You can take 1-3 tablets Trazodone 30 minutes before bedtime  Make sure you have at least 8 hours before you need to get up, otherwise you may wake up drowsy    No electronic devices in the bedroom  Avoid electronic devices 30-60 minutes before bed  If you wake up during the night, do not lay in bed for more than 20 minutes. If you can't fall asleep, go out of the bedroom but again, avoid electronic devices    Call the clinic if you don't notice any improvement, even at 150 mg/day for a few days and we can try a different medication    You can continue taking melatonin. It will signal to your brain to get ready for bed, but it won't make you drowsy      LEV Jim CNP  [facility]

## 2018-04-20 ENCOUNTER — TELEPHONE (OUTPATIENT)
Dept: FAMILY MEDICINE | Facility: CLINIC | Age: 68
End: 2018-04-20

## 2018-04-20 NOTE — TELEPHONE ENCOUNTER
The patient is requesting the Advanced Care directive (blank form) be mailed to his home address.   [address] [place] [address]    .Mary Shaffer  Patient Representative

## 2018-04-20 NOTE — TELEPHONE ENCOUNTER
"I printed copy of blank directive, [facility] brochure and \"guide to ACP\", composed letter and mailed all to patient's home address as listed in Epic.    Sherice Narayan RN  [facility]      "

## 2018-04-20 NOTE — LETTER
Dear Kapil,      Thank you for your interest in receiving information on Advance Care Planning and Health Care Directives. Discussing and making decisions about this part of our health is very important. A Health Care Directive is a written document that outlines your goals, values, beliefs and choices for health care and medical treatment in the event you are unable to speak for yourself.     Informational materials can be downloaded and printed for free at our web site: [email or web address]    Free group classes on Advance Care Planning and completing a Health Care Directive are available at multiple locations and times. These 90 minute classes are led by trained facilitators who will provide information and guide you through a Health Care Directive form. They can also review, notarize and add your Health Care Directive to your medical record. [place] for a class at: [email or web address] or by calling [facility] [place] [employer] at [phone] or toll free [phone].     COPIES of completed Health Care Directives can be brought or mailed to any of  our locations including the address listed above in header. You may also e-mail a copy to [email or web address] .     Call the clinic at [phone] for questions, assistance or to make an appointment to discuss creating a Health Care Directive.    Enclosed is a copy of the directive and informational handouts.       Sincerely,        Sherice Narayan RN  [facility]

## 2018-04-24 ENCOUNTER — OFFICE VISIT (OUTPATIENT)
Dept: FAMILY MEDICINE | Facility: CLINIC | Age: 68
End: 2018-04-24
Payer: COMMERCIAL

## 2018-04-24 VITALS
WEIGHT: 181 LBS | DIASTOLIC BLOOD PRESSURE: 81 MMHG | SYSTOLIC BLOOD PRESSURE: 137 MMHG | HEART RATE: 85 BPM | BODY MASS INDEX: 26.35 KG/M2 | TEMPERATURE: 98.5 F | OXYGEN SATURATION: 97 %

## 2018-04-24 DIAGNOSIS — M76.899 KNEE TENDONITIS: ICD-10-CM

## 2018-04-24 DIAGNOSIS — Z23 NEED FOR PNEUMOCOCCAL VACCINATION: ICD-10-CM

## 2018-04-24 DIAGNOSIS — M25.562 ACUTE PAIN OF LEFT KNEE: Primary | ICD-10-CM

## 2018-04-24 PROCEDURE — 99213 OFFICE O/P EST LOW 20 MIN: CPT | Mod: 25 | Performed by: NURSE PRACTITIONER

## 2018-04-24 PROCEDURE — 90670 PCV13 VACCINE IM: CPT | Performed by: NURSE PRACTITIONER

## 2018-04-24 PROCEDURE — G0009 ADMIN PNEUMOCOCCAL VACCINE: HCPCS | Performed by: NURSE PRACTITIONER

## 2018-04-24 ASSESSMENT — PAIN SCALES - GENERAL: PAINLEVEL: MILD PAIN (2)

## 2018-04-24 NOTE — PATIENT INSTRUCTIONS
Schedule an annual physical exam early September. Come fasting so we can check your cholesterol and fasting glucose.

## 2018-04-24 NOTE — NURSING NOTE
Screening Questionnaire for Adult Immunization   Are you sick today?   No    Do you have allergies to medications, food, a vaccine component or latex?   No    Have you ever had a serious reaction after receiving a vaccination?   No    Do you have a long-term health problem with heart disease, lung disease,  asthma, kidney disease, metabolic (e.g., diabetes), anemia, or other blood disorder?   No    Do you have cancer, leukemia,HIV/ AIDS, or any immune system problem?   No       In the past 3 months, have you taken medications that weaken your immune system, such as cortisone, prednisone, other steroids, or anticancer drugs, or have you had radiation treatments?   No    Have you had a seizure or a brain, or other nervous system problem?   No    During the past year, have you received a transfusion of blood or blood       products, or been given a  immune (gamma) globulin or an antiviral drug?   No    For women: Are you pregnant or is there a chance you could become         pregnant during the next month?   No    Have you received any vaccinations in the past 4 weeks?   No     Immunization questionnaire answers were all negative.     VIS for Prevnar given on same date of administration.  Staff signature/Title: SANDIP Burden MA  Patient/or Parent of Patient instructed to wait 20 minutes after injection in the case of a allergic reaction.

## 2018-04-24 NOTE — MR AVS SNAPSHOT
After Visit Summary   4/24/2018    Kapil Auguste    MRN: [id number]           Patient Information     Date Of Birth          1950        Visit Information        Provider Department      4/24/2018 8:40 AM Shadia Foote APRN CNP [facility]        Today's Diagnoses     Acute pain of left knee    -  1    Knee tendonitis        Need for pneumococcal vaccination          Care Instructions    Schedule an annual physical exam early September. Come fasting so we can check your cholesterol and fasting glucose.           Follow-ups after your visit        Follow-up notes from your care team     Return if symptoms worsen or fail to improve.      Who to contact     If you have questions or need follow up information about today's clinic visit or your schedule please contact [facility] directly at [phone].  Normal or non-critical lab and imaging results will be communicated to you by [email or web address], letter or phone within 4 business days after the clinic has received the results. If you do not hear from us within 7 days, please contact the clinic through MyChart or phone. If you have a critical or abnormal lab result, we will notify you by phone as soon as possible.  Submit refill requests through [employer] or call your pharmacy and they will forward the refill request to us. Please allow 3 business days for your refill to be completed.          Additional Information About Your Visit        MyChart Information     [employer] gives you secure access to your electronic health record. If you see a primary care provider, you can also send messages to your care team and make appointments. If you have questions, please call your primary care clinic.  If you do not have a primary care provider, please call [phone] and they will assist you.        Care EveryWhere ID     This is your Care EveryWhere ID. This could be used by other organizations to access your  [facility] medical records  [id number]        Your Vitals Were     Pulse Temperature Pulse Oximetry BMI (Body Mass Index)          85 98.5  F (36.9  C) (Oral) 97% 26.35 kg/m2         Blood Pressure from Last 3 Encounters:   04/24/18 137/81   04/06/18 112/68   01/03/18 128/78    Weight from Last 3 Encounters:   04/24/18 181 lb (82.1 kg)   04/06/18 180 lb 12 oz (82 kg)   01/03/18 186 lb (84.4 kg)              We Performed the Following     ADMIN: Vaccine, Initial (23895)     Pneumococcal vaccine 13 valent PCV13 IM (Prevnar) [36391]        Primary Care Provider Office Phone # Fax #    Shadia Isaac Qamar APRDANIELLE [facility] [phone]       [address] [facility] [address]        Equal Access to Services     [facility] Campus[facility]ANCA : Hadii aad ku hadasho Soomaali, waaxda luqadaha, qaybta kaalmada adeegyada, etienne pickettin haygabo wu . So [facility] [phone].    ATENCIÓN: Si habla español, tiene a king disposición servicios gratuitos de asistencia lingüística. Llame al [phone].    We comply with applicable federal civil rights laws and [place] laws. We do not discriminate on the basis of race, color, national origin, age, disability, sex, sexual orientation, or gender identity.            Thank you!     Thank you for choosing [facility]  for your care. Our goal is always to provide you with excellent care. Hearing back from our patients is one way we can continue to improve our services. Please take a few minutes to complete the written survey that you may receive in the mail after your visit with us. Thank you!             Your Updated Medication List - Protect others around you: Learn how to safely use, store and throw away your medicines at www.disposemymeds.org.          This list is accurate as of 4/24/18  3:51 PM.  Always use your most recent med list.                   Brand Name Dispense Instructions for use Diagnosis    CLARITIN 10 MG capsule   Generic drug:   loratadine      Take 10 mg by mouth daily. As needed        fish oil-omega-3 fatty acids 1000 MG capsule      Take 1 g by mouth daily        lisinopril-hydrochlorothiazide 10-12.5 MG per tablet    PRINZIDE/ZESTORETIC    90 tablet    Take 1 tablet by mouth daily    Hypertension goal BP (blood pressure) < 140/90       Lysine 500 MG Tabs      Take 1 tablet by mouth daily        order for DME     1 each    BP monitor device    Hypertension goal BP (blood pressure) < 140/90       ranitidine 150 MG tablet    ZANTAC    60 tablet    Take 1 tablet (150 mg) by mouth 2 times daily    Gastroesophageal reflux disease, esophagitis presence not specified       traZODone 50 MG tablet    DESYREL    60 tablet    Take 1-3 tablets ( mg) by mouth nightly as needed for sleep    Primary insomnia       triamcinolone 0.1 % cream    KENALOG    30 g    Apply sparingly to affected area three times daily for 14 days.    Dermatitis       valACYclovir 1000 mg tablet    VALTREX    4 tablet    Take 2 tablets (2,000 mg) by mouth 2 times daily    Cold sore

## 2018-04-24 NOTE — PROGRESS NOTES
SUBJECTIVE:   Kapil Auguste is a 67 year old male who presents to clinic today for the following health issues:      Joint Pain    Onset: 1 week aog    Description:   Location: left knee  Character: Burning    Intensity: mild    Progression of Symptoms: better    Accompanying Signs & Symptoms:  Other symptoms: none    History:   Previous similar pain: no       Precipitating factors:   Trauma or overuse: no     Alleviating factors:  Improved by: Time    Therapies Tried and outcome: OTC Aspercreme and Blue Emu not helpful    Developed left lateral knee pain 2 weeks ago  Pain is improving  Denies history of trauma  No previous history of knee pain  Tylenol provided some relief  Pain worsened by: certain movements, knee flex and inverted        Problem list and histories reviewed & adjusted, as indicated.  Additional history: none    Patient Active Problem List   Diagnosis     Advanced directives, counseling/discussion     Adjustment disorder     CARDIOVASCULAR SCREENING; LDL GOAL LESS THAN 130     Hypertension goal BP (blood pressure) < 140/90     History reviewed. No pertinent surgical history.    Social History   Substance Use Topics     Smoking status: Former Smoker     Types: Cigarettes     Quit date: 3/20/2018     Smokeless tobacco: Never Used     Alcohol use Yes      Comment: occ.     Family History   Problem Relation Age of Onset     DIABETES Mother      Hypertension Mother      HEART DISEASE Father      Hypertension Father      CANCER Paternal Uncle      pamcreatic           Reviewed and updated as needed this visit by clinical staff  Tobacco  Allergies  Meds  Med Hx  Surg Hx  Fam Hx  Soc Hx      Reviewed and updated as needed this visit by Provider         ROS:  Noncontributory except as above    OBJECTIVE:     /81 (BP Location: Right arm, Patient Position: Chair, Cuff Size: Adult Regular)  Pulse 85  Temp 98.5  F (36.9  C) (Oral)  Wt 181 lb (82.1 kg)  SpO2 97%  BMI 26.35 kg/m2  Body  mass index is 26.35 kg/(m^2).  GENERAL: healthy, alert and no distress  MS: Gait steady and appropriate.   SKIN: no suspicious lesions or rashes  NEURO: Normal strength and tone, mentation intact and speech normal    Diagnostic Test Results:  none     ASSESSMENT/PLAN:       ICD-10-CM    1. Acute pain of left knee M25.562    2. Knee tendonitis M76.899    3. Need for pneumococcal vaccination Z23 Pneumococcal vaccine 13 valent PCV13 IM (Prevnar) [32588]     ADMIN: Vaccine, Initial (05758)     New onset acute pain without history of trauma, and pain is improving. Physical exam unremarkable. Suspect tendonitis. Tylenol PRN for pain which he hasn't needed thus far. Let me know if symptoms worsening.     Patient Instructions   Schedule an annual physical exam early September. Come fasting so we can check your cholesterol and fasting glucose.       LEV Jim CNP  [facility]

## 2018-09-07 ENCOUNTER — RADIANT APPOINTMENT (OUTPATIENT)
Dept: GENERAL RADIOLOGY | Facility: CLINIC | Age: 68
End: 2018-09-07
Attending: NURSE PRACTITIONER
Payer: COMMERCIAL

## 2018-09-07 ENCOUNTER — OFFICE VISIT (OUTPATIENT)
Dept: FAMILY MEDICINE | Facility: CLINIC | Age: 68
End: 2018-09-07
Payer: COMMERCIAL

## 2018-09-07 VITALS
TEMPERATURE: 97.1 F | HEIGHT: 70 IN | DIASTOLIC BLOOD PRESSURE: 74 MMHG | BODY MASS INDEX: 26.77 KG/M2 | HEART RATE: 74 BPM | SYSTOLIC BLOOD PRESSURE: 117 MMHG | OXYGEN SATURATION: 95 % | WEIGHT: 187 LBS

## 2018-09-07 DIAGNOSIS — M77.01 MEDIAL EPICONDYLITIS OF ELBOW, RIGHT: ICD-10-CM

## 2018-09-07 DIAGNOSIS — M54.50 LEFT-SIDED LOW BACK PAIN WITHOUT SCIATICA, UNSPECIFIED CHRONICITY: ICD-10-CM

## 2018-09-07 DIAGNOSIS — F51.01 PRIMARY INSOMNIA: ICD-10-CM

## 2018-09-07 DIAGNOSIS — Z12.5 SCREENING FOR PROSTATE CANCER: ICD-10-CM

## 2018-09-07 DIAGNOSIS — M25.562 LEFT KNEE PAIN, UNSPECIFIED CHRONICITY: ICD-10-CM

## 2018-09-07 DIAGNOSIS — Z12.11 SCREEN FOR COLON CANCER: Primary | ICD-10-CM

## 2018-09-07 DIAGNOSIS — I10 HYPERTENSION GOAL BP (BLOOD PRESSURE) < 140/90: ICD-10-CM

## 2018-09-07 LAB
ANION GAP SERPL CALCULATED.3IONS-SCNC: 8 MMOL/L (ref 3–14)
BUN SERPL-MCNC: 16 MG/DL (ref 7–30)
CALCIUM SERPL-MCNC: 9.1 MG/DL (ref 8.5–10.1)
CHLORIDE SERPL-SCNC: 106 MMOL/L (ref 94–109)
CHOLEST SERPL-MCNC: 173 MG/DL
CO2 SERPL-SCNC: 27 MMOL/L (ref 20–32)
CREAT SERPL-MCNC: 0.99 MG/DL (ref 0.66–1.25)
GFR SERPL CREATININE-BSD FRML MDRD: 75 ML/MIN/1.7M2
GLUCOSE SERPL-MCNC: 92 MG/DL (ref 70–99)
HDLC SERPL-MCNC: 72 MG/DL
LDLC SERPL CALC-MCNC: 87 MG/DL
NONHDLC SERPL-MCNC: 101 MG/DL
POTASSIUM SERPL-SCNC: 4.3 MMOL/L (ref 3.4–5.3)
PSA SERPL-ACNC: 1.19 UG/L (ref 0–4)
SODIUM SERPL-SCNC: 141 MMOL/L (ref 133–144)
TRIGL SERPL-MCNC: 72 MG/DL

## 2018-09-07 PROCEDURE — 80061 LIPID PANEL: CPT | Performed by: NURSE PRACTITIONER

## 2018-09-07 PROCEDURE — 36415 COLL VENOUS BLD VENIPUNCTURE: CPT | Performed by: NURSE PRACTITIONER

## 2018-09-07 PROCEDURE — 72100 X-RAY EXAM L-S SPINE 2/3 VWS: CPT | Mod: FY

## 2018-09-07 PROCEDURE — G0103 PSA SCREENING: HCPCS | Performed by: NURSE PRACTITIONER

## 2018-09-07 PROCEDURE — 99213 OFFICE O/P EST LOW 20 MIN: CPT | Mod: 25 | Performed by: NURSE PRACTITIONER

## 2018-09-07 PROCEDURE — 80048 BASIC METABOLIC PNL TOTAL CA: CPT | Performed by: NURSE PRACTITIONER

## 2018-09-07 PROCEDURE — 73560 X-RAY EXAM OF KNEE 1 OR 2: CPT | Mod: LT

## 2018-09-07 PROCEDURE — 99397 PER PM REEVAL EST PAT 65+ YR: CPT | Performed by: NURSE PRACTITIONER

## 2018-09-07 RX ORDER — TRAZODONE HYDROCHLORIDE 50 MG/1
25-50 TABLET, FILM COATED ORAL
Qty: 60 TABLET | Refills: 1 | Status: SHIPPED | OUTPATIENT
Start: 2018-09-07 | End: 2019-06-18

## 2018-09-07 RX ORDER — LISINOPRIL/HYDROCHLOROTHIAZIDE 10-12.5 MG
1 TABLET ORAL DAILY
Qty: 90 TABLET | Refills: 3 | Status: SHIPPED | OUTPATIENT
Start: 2018-09-07 | End: 2019-07-03

## 2018-09-07 ASSESSMENT — ENCOUNTER SYMPTOMS
HEMATOCHEZIA: 0
CONSTIPATION: 0
DIARRHEA: 0
DIZZINESS: 0
COUGH: 0
ABDOMINAL PAIN: 0
CHILLS: 0
EYE PAIN: 0
HEMATURIA: 0

## 2018-09-07 ASSESSMENT — PAIN SCALES - GENERAL: PAINLEVEL: NO PAIN (0)

## 2018-09-07 ASSESSMENT — ACTIVITIES OF DAILY LIVING (ADL)
CURRENT_FUNCTION: NO ASSISTANCE NEEDED
I_NEED_ASSISTANCE_FOR_THE_FOLLOWING_DAILY_ACTIVITIES:: NO ASSISTANCE IS NEEDED

## 2018-09-07 NOTE — MR AVS SNAPSHOT
After Visit Summary   9/7/2018    Kapil Auguste    MRN: [id number]           Patient Information     Date Of Birth          1950        Visit Information        Provider Department      9/7/2018 9:20 AM hSadia Foote APRN [facility]        Today's Diagnoses     Screen for colon cancer    -  1    Hypertension goal BP (blood pressure) < 140/90        Primary insomnia        Left knee pain, unspecified chronicity        Left-sided low back pain without sciatica, unspecified chronicity        Medial epicondylitis of elbow, right          Care Instructions      Preventive Health Recommendations:       Male Ages 65 and over    Yearly exam:             See your health care provider every year in order to  o   Review health changes.   o   Discuss preventive care.    o   Review your medicines if your doctor has prescribed any.    Talk with your health care provider about whether you should have a test to screen for prostate cancer (PSA).    Every 3 years, have a diabetes test (fasting glucose). If you are at risk for diabetes, you should have this test more often.    Every 5 years, have a cholesterol test. Have this test more often if you are at risk for high cholesterol or heart disease.     Every 10 years, have a colonoscopy. Or, have a yearly FIT test (stool test). These exams will check for colon cancer.    Talk to with your health care provider about screening for Abdominal Aortic Aneurysm if you have a family history of AAA or have a history of smoking.  Shots:     Get a flu shot each year.     Get a tetanus shot every 10 years.     Talk to your doctor about your pneumonia vaccines. There are now two you should receive - Pneumovax (PPSV 23) and Prevnar (PCV 13).    Talk to your pharmacist about a shingles vaccine.     Talk to your doctor about the hepatitis B vaccine.  Nutrition:     Eat at least 5 servings of fruits and vegetables each day.     Eat  whole-grain bread, whole-wheat pasta and brown rice instead of white grains and rice.     Get adequate Calcium and Vitamin D.   Lifestyle    Exercise for at least 150 minutes a week (30 minutes a day, 5 days a week). This will help you control your weight and prevent disease.     Limit alcohol to one drink per day.     No smoking.     Wear sunscreen to prevent skin cancer.     See your dentist every six months for an exam and cleaning.   See your eye doctor every 1 to 2 years to screen for conditions such as glaucoma, macular degeneration and cataracts.  Understanding Medial Epicondylitis    Several muscles attach to the arm at the elbow joint. The tough bands of tissue that attach muscle to bones are called tendons. The bone in the upper arm has knobs on the farthest end called epicondyles. Tendons attach some arm muscles to these knobs. The tissues in this area can become irritated.  Epicondylitis is the medical term for a painful elbow over the epicondyle. Medial refers to the inner side of the elbow. Medial epicondylitis is sometimes called  golfer s elbow.      How to say it  LORA-fabienne-[facility]-y[facility]-OEQK-dye-lie-tis   Causes of medial epicondylitis  A painful inner elbow may be caused by:  Using an elbow or hand the same way over and over  Using poor form or too much force in a sport such as golf, tennis, or baseball  Lifting too heavy a weight  Other injuries to the arm or elbow  Symptoms of medial epicondylitis  Pain or tenderness on the inside of the elbow that may travel down the forearm  Pain when moving the wrist  Pain or weakness when gripping something  A crackling sound or grating feeling when moving the elbow  Treatment for medial epicondylitis  Treatments may include:  Avoiding or changing the action that caused the problem. This helps prevent irritating the tissues more.  Prescription or over-the-counter pain medicines. These help reduce inflammation, swelling, and pain.  Cold or heat packs. These help  reduce pain and swelling.  Stretching and other exercises. These improve flexibility and strength.  Physical therapy. This may include exercises or other treatments.  Injections of medicine. This may relieve symptoms.  If other treatments do not relieve symptoms, you may need surgery.  Possible complications  If you don t give your elbow time to heal, symptoms may return or get worse. Follow your healthcare provider s instructions on resting and treating your elbow.     When to call your healthcare provider  Call your healthcare provider right away if you have any of these:  Fever of 100.4 F (38 C) or higher, or as directed  Redness, swelling, or warmth that gets worse  Symptoms that don t get better with prescribed medicines, or get worse  New symptoms   Date Last Reviewed: 3/10/2016    1310-9143 [employer]. [address] [place] [address]. All rights reserved. This information is not intended as a substitute for professional medical care. Always follow your healthcare professional's instructions.        Radial Deviation (Strength)    This exercise is written for your right elbow. Switch sides for your left elbow.  Stand up straight. Hold a hand weight in your right hand. Your healthcare provider will tell you what size of hand weight to use.  Keep your arm straight down at your side. Bend your wrist forward to lift the weight. Don t move your arm, only your wrist.  Hold for 5 seconds. Slowly lower your hand back down.  Repeat 5 to 10 times, or as instructed.  Date Last Reviewed: 3/10/2016    8572-4398 [employer]. [address] [place] [address]. All rights reserved. This information is not intended as a substitute for professional medical care. Always follow your healthcare professional's instructions.        Ulnar Deviation (Strength)     This exercise is written for your right elbow. Switch sides for your left elbow.  Stand up straight. Hold a hand weight in your  right hand. Your healthcare provider will tell you what size of hand weight to use.  Keep your arm straight down at your side. Bend your wrist backward to lift the weight. Don t move your arm, only your wrist.  Hold for 5 seconds. Slowly lower your hand back down.  Repeat 5 to 10 times, or as instructed.  Date Last Reviewed: 3/10/2016    6573-5406 [employer]. [address] [place] [address]. All rights reserved. This information is not intended as a substitute for professional medical care. Always follow your healthcare professional's instructions.        Wrist Extension (Strength)     This exercise is written for your right elbow. Switch sides for your left elbow.  Sit in a chair. Hold a hand weight in your right hand. Your healthcare provider will tell you what size of hand weight to use.  Lean your forearm on your thigh or a table. Hang your wrist off the end of your knee or edge of the table, palm down.  Keep your forearm in place and bend your wrist upward. Lift the hand weight as high as you can.  Slowly lower the hand weight back down.  Repeat 5 times, or as instructed.  Date Last Reviewed: 3/10/2016    1326-9966 [employer]. [address] [place] [address]. All rights reserved. This information is not intended as a substitute for professional medical care. Always follow your healthcare professional's instructions.        Wrist Flexion (Flexibility)    Stand or sit and hold your arms straight out in front of you.  Dangle your right hand at the wrist. Gently press down on your right hand with your left hand. Feel the stretch in your right wrist and the top of your hand.  Hold for 30 to 60 seconds, then relax.  Switch arms and repeat 2 times.   Date Last Reviewed: 3/10/2016    7924-6297 [employer]. [address] [place] [address]. All rights reserved. This information is not intended as a substitute for professional medical care. Always  follow your healthcare professional's instructions.                  Follow-ups after your visit        Additional Services     [facility] PT, HAND, AND CHIROPRACTIC REFERRAL       **This order will print in the [facility] Scheduling Office**    Physical Therapy, Hand Therapy and Chiropractic Care are available through:    *[facility]  *[facility]  *[facility]    Call one number to schedule at any of the above locations: [phone].    Your provider has referred you to: Physical Therapy at [facility] or [facility]    Indication/Reason for Referral: Elbow Pain, Knee Pain and Low Back Pain  Onset of Illness: 6+ month history of intermittent right elbow, left knee and left low back pain. Xrays done of knee and back. Suspect medial epicondylitis right elbow and given compression wrap. He is a golfer  Therapy Orders: Evaluate and Treat  Special Programs: None  Special Request: None    Augusto Casas      Additional Comments for the Therapist or Chiropractor: see above    Please be aware that coverage of these services is subject to the terms and limitations of your health insurance plan.  Call member services at your health plan with any benefit or coverage questions.      Please bring the following to your appointment:    *Your personal calendar for scheduling future appointments  *Comfortable clothing                  Who to contact     If you have questions or need follow up information about today's clinic visit or your schedule please contact [facility] directly at [phone].  Normal or non-critical lab and imaging results will be communicated to you by MyChart, letter or phone within 4 business days after the clinic has received the results. If you do not hear from us within 7 days, please contact the clinic through MyChart or phone. If you have a critical or abnormal lab result, we will notify you by phone as soon as possible.  Submit refill requests  "through [employer] or call your pharmacy and they will forward the refill request to us. Please allow 3 business days for your refill to be completed.          Additional Information About Your Visit        [employer] Information     [employer] gives you secure access to your electronic health record. If you see a primary care provider, you can also send messages to your care team and make appointments. If you have questions, please call your primary care clinic.  If you do not have a primary care provider, please call [phone] and they will assist you.        Care EveryWhere ID     This is your Care EveryWhere ID. This could be used by other organizations to access your [facility] medical records  [id number]        Your Vitals Were     Pulse Temperature Height Pulse Oximetry BMI (Body Mass Index)       74 97.1  F (36.2  C) (Oral) 5' 9.5\" (1.765 m) 95% 27.22 kg/m2        Blood Pressure from Last 3 Encounters:   09/07/18 117/74   04/24/18 137/81   04/06/18 112/68    Weight from Last 3 Encounters:   09/07/18 187 lb (84.8 kg)   04/24/18 181 lb (82.1 kg)   04/06/18 180 lb 12 oz (82 kg)              We Performed the Following     Basic metabolic panel     [facility] PT, HAND, AND CHIROPRACTIC REFERRAL     Lipid panel reflex to direct LDL Fasting          Today's Medication Changes          These changes are accurate as of 9/7/18 10:29 AM.  If you have any questions, ask your nurse or doctor.               These medicines have changed or have updated prescriptions.        Dose/Directions    traZODone 50 MG tablet   Commonly known as:  DESYREL   This may have changed:  how much to take   Used for:  Primary insomnia   Changed by:  Shadia Foote APRN CNP        Dose:  25-50 mg   Take 0.5-1 tablets (25-50 mg) by mouth nightly as needed for sleep   Quantity:  60 tablet   Refills:  1            Where to get your medicines      These medications were sent to [facility] - [place], [place] - [address]  [address], [facility] [address]     Phone:  [phone]     lisinopril-hydrochlorothiazide 10-12.5 MG per tablet    traZODone 50 MG tablet                Primary Care Provider Office Phone # Fax #    LEV Knight [phone]       [address] [facility] [address]        Equal Access to Services     JOSE ANGEL LEBRON : Hadii aad ku hadasho Soomaali, waaxda luqadaha, qaybta kaalmada adeegyada, waxay idiin hayaan adeeg kharash la'gabo . So [facility] [phone].    ATENCIÓN: Si habla español, tiene a king disposición servicios gratuitos de asistencia lingüística. Llame al [phone].    We comply with applicable federal civil rights laws and [place] laws. We do not discriminate on the basis of race, color, national origin, age, disability, sex, sexual orientation, or gender identity.            Thank you!     Thank you for choosing [facility]  for your care. Our goal is always to provide you with excellent care. Hearing back from our patients is one way we can continue to improve our services. Please take a few minutes to complete the written survey that you may receive in the mail after your visit with us. Thank you!             Your Updated Medication List - Protect others around you: Learn how to safely use, store and throw away your medicines at www.disposemymeds.org.          This list is accurate as of 9/7/18 10:29 AM.  Always use your most recent med list.                   Brand Name Dispense Instructions for use Diagnosis    CLARITIN 10 MG capsule   Generic drug:  loratadine      Take 10 mg by mouth daily. As needed        fish oil-omega-3 fatty acids 1000 MG capsule      Take 1 g by mouth daily        lisinopril-hydrochlorothiazide 10-12.5 MG per tablet    PRINZIDE/ZESTORETIC    90 tablet    Take 1 tablet by mouth daily    Hypertension goal BP (blood pressure) < 140/90       Lysine 500 MG Tabs      Take 1 tablet by mouth  daily        order for DME     1 each    BP monitor device    Hypertension goal BP (blood pressure) < 140/90       traZODone 50 MG tablet    DESYREL    60 tablet    Take 0.5-1 tablets (25-50 mg) by mouth nightly as needed for sleep    Primary insomnia       valACYclovir 1000 mg tablet    VALTREX    4 tablet    Take 2 tablets (2,000 mg) by mouth 2 times daily    Cold sore

## 2018-09-07 NOTE — PATIENT INSTRUCTIONS
Preventive Health Recommendations:       Male Ages 65 and over    Yearly exam:             See your health care provider every year in order to  o   Review health changes.   o   Discuss preventive care.    o   Review your medicines if your doctor has prescribed any.    Talk with your health care provider about whether you should have a test to screen for prostate cancer (PSA).    Every 3 years, have a diabetes test (fasting glucose). If you are at risk for diabetes, you should have this test more often.    Every 5 years, have a cholesterol test. Have this test more often if you are at risk for high cholesterol or heart disease.     Every 10 years, have a colonoscopy. Or, have a yearly FIT test (stool test). These exams will check for colon cancer.    Talk to with your health care provider about screening for Abdominal Aortic Aneurysm if you have a family history of AAA or have a history of smoking.  Shots:     Get a flu shot each year.     Get a tetanus shot every 10 years.     Talk to your doctor about your pneumonia vaccines. There are now two you should receive - Pneumovax (PPSV 23) and Prevnar (PCV 13).    Talk to your pharmacist about a shingles vaccine.     Talk to your doctor about the hepatitis B vaccine.  Nutrition:     Eat at least 5 servings of fruits and vegetables each day.     Eat whole-grain bread, whole-wheat pasta and brown rice instead of white grains and rice.     Get adequate Calcium and Vitamin D.   Lifestyle    Exercise for at least 150 minutes a week (30 minutes a day, 5 days a week). This will help you control your weight and prevent disease.     Limit alcohol to one drink per day.     No smoking.     Wear sunscreen to prevent skin cancer.     See your dentist every six months for an exam and cleaning.   See your eye doctor every 1 to 2 years to screen for conditions such as glaucoma, macular degeneration and cataracts.  Understanding Medial Epicondylitis    Several muscles attach to the  arm at the elbow joint. The tough bands of tissue that attach muscle to bones are called tendons. The bone in the upper arm has knobs on the farthest end called epicondyles. Tendons attach some arm muscles to these knobs. The tissues in this area can become irritated.  Epicondylitis is the medical term for a painful elbow over the epicondyle. Medial refers to the inner side of the elbow. Medial epicondylitis is sometimes called  golfer s elbow.      How to say it  [facility]-dye-lie-tis   Causes of medial epicondylitis  A painful inner elbow may be caused by:  Using an elbow or hand the same way over and over  Using poor form or too much force in a sport such as golf, tennis, or baseball  Lifting too heavy a weight  Other injuries to the arm or elbow  Symptoms of medial epicondylitis  Pain or tenderness on the inside of the elbow that may travel down the forearm  Pain when moving the wrist  Pain or weakness when gripping something  A crackling sound or grating feeling when moving the elbow  Treatment for medial epicondylitis  Treatments may include:  Avoiding or changing the action that caused the problem. This helps prevent irritating the tissues more.  Prescription or over-the-counter pain medicines. These help reduce inflammation, swelling, and pain.  Cold or heat packs. These help reduce pain and swelling.  Stretching and other exercises. These improve flexibility and strength.  Physical therapy. This may include exercises or other treatments.  Injections of medicine. This may relieve symptoms.  If other treatments do not relieve symptoms, you may need surgery.  Possible complications  If you don t give your elbow time to heal, symptoms may return or get worse. Follow your healthcare provider s instructions on resting and treating your elbow.     When to call your healthcare provider  Call your healthcare provider right away if you have any of these:  Fever of 100.4 F (38 C) or higher, or as  directed  Redness, swelling, or warmth that gets worse  Symptoms that don t get better with prescribed medicines, or get worse  New symptoms   Date Last Reviewed: 3/10/2016    7011-4351 [employer]. [address] [place] [address]. All rights reserved. This information is not intended as a substitute for professional medical care. Always follow your healthcare professional's instructions.        Radial Deviation (Strength)    This exercise is written for your right elbow. Switch sides for your left elbow.  Stand up straight. Hold a hand weight in your right hand. Your healthcare provider will tell you what size of hand weight to use.  Keep your arm straight down at your side. Bend your wrist forward to lift the weight. Don t move your arm, only your wrist.  Hold for 5 seconds. Slowly lower your hand back down.  Repeat 5 to 10 times, or as instructed.  Date Last Reviewed: 3/10/2016    0538-2361 [employer]. [address] [place] [address]. All rights reserved. This information is not intended as a substitute for professional medical care. Always follow your healthcare professional's instructions.        Ulnar Deviation (Strength)     This exercise is written for your right elbow. Switch sides for your left elbow.  Stand up straight. Hold a hand weight in your right hand. Your healthcare provider will tell you what size of hand weight to use.  Keep your arm straight down at your side. Bend your wrist backward to lift the weight. Don t move your arm, only your wrist.  Hold for 5 seconds. Slowly lower your hand back down.  Repeat 5 to 10 times, or as instructed.  Date Last Reviewed: 3/10/2016    8882-6600 [employer]. [address] [place] [address]. All rights reserved. This information is not intended as a substitute for professional medical care. Always follow your healthcare professional's instructions.        Wrist Extension (Strength)     This  exercise is written for your right elbow. Switch sides for your left elbow.  Sit in a chair. Hold a hand weight in your right hand. Your healthcare provider will tell you what size of hand weight to use.  Lean your forearm on your thigh or a table. Hang your wrist off the end of your knee or edge of the table, palm down.  Keep your forearm in place and bend your wrist upward. Lift the hand weight as high as you can.  Slowly lower the hand weight back down.  Repeat 5 times, or as instructed.  Date Last Reviewed: 3/10/2016    2132-1740 [employer]. [address] [place] [address]. All rights reserved. This information is not intended as a substitute for professional medical care. Always follow your healthcare professional's instructions.        Wrist Flexion (Flexibility)    Stand or sit and hold your arms straight out in front of you.  Dangle your right hand at the wrist. Gently press down on your right hand with your left hand. Feel the stretch in your right wrist and the top of your hand.  Hold for 30 to 60 seconds, then relax.  Switch arms and repeat 2 times.   Date Last Reviewed: 3/10/2016    3497-9187 [employer]. [address] [place] [address]. All rights reserved. This information is not intended as a substitute for professional medical care. Always follow your healthcare professional's instructions.

## 2018-09-07 NOTE — PROGRESS NOTES
SUBJECTIVE:   Kapil Auguste is a 68 year old male who presents for Preventive Visit.      Are you in the first 12 months of your Medicare coverage?  No    Physical   Annual:     Getting at least 3 servings of Calcium per day:  Yes    Bi-annual eye exam:  Yes    Dental care twice a year:  Yes    Sleep apnea or symptoms of sleep apnea:  None    Diet:  Regular (no restrictions)    Frequency of exercise:  6-7 days/week    Duration of exercise:  Greater than 60 minutes    Taking medications regularly:  Yes    Medication side effects:  None    Additional concerns today:  YES (Want to talk about poss, arthritis pain in his joints.)    Ability to successfully perform activities of daily living: no assistance needed    Home Safety:  No safety concerns identified    Hearing Impairment: no hearing concerns             COGNITIVE SCREEN  1) Repeat 3 items (Leader, Season, Table)    2) Clock draw: NORMAL  3) 3 item recall: Recalls 2 objects   Results: NORMAL clock, 1-2 items recalled: COGNITIVE IMPAIRMENT LESS LIKELY    Mini-CogTM Copyright S Yaakov. Licensed by the author for use in [facility]; reprinted with permission (summer@[facility]). All rights reserved.      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       Alcohol Use 9/7/2018   If you drink alcohol do you typically have greater than 3 drinks per day OR greater than 7 drinks per week? No         Today's PHQ-2 Score:   PHQ-2 ( 1999 [employer]) 9/7/2018   Q1: Little interest or pleasure in doing things 0   Q2: Feeling down, depressed or hopeless 0   PHQ-2 Score 0   Q1: Little interest or pleasure in doing things Not at all   Q2: Feeling down, depressed or hopeless Not at all   PHQ-2 Score 0       Do you feel safe in your environment - Yes    Do you have a Health Care Directive?: Yes: Advance Directive has been received and scanned.    Current providers sharing in care for this patient include:   Patient Care Team:  Shadia Foote APRN CNP as PCP - General (Nurse Practitioner - Adult Health)    The following health maintenance items are reviewed in Epic and correct as of today:  Health Maintenance   Topic Date Due     AORTIC ANEURYSM SCREENING (SYSTEM ASSIGNED)  07/28/2013     LIPID SCREEN Q5 YR MALE (SYSTEM ASSIGNED)  09/26/2016     INFLUENZA VACCINE (1) 09/01/2018     FALL RISK ASSESSMENT  08/29/2018     FIT Q1 YR  08/29/2018     PHQ-2 Q1 YR  08/29/2018     ADVANCE DIRECTIVE PLANNING Q5 YRS  04/20/2023     TETANUS IMMUNIZATION (SYSTEM ASSIGNED)  05/08/2024     COLONOSCOPY Q10 YR  06/24/2024     PNEUMOCOCCAL  Completed     HEPATITIS C SCREENING  Completed     Labs reviewed in EPIC  BP Readings from Last 3 Encounters:   09/07/18 117/74   04/24/18 137/81   04/06/18 112/68    Wt Readings from Last 3 Encounters:   09/07/18 187 lb (84.8 kg)   04/24/18 181 lb (82.1 kg)   04/06/18 180 lb 12 oz (82 kg)                 Review of Systems   Constitutional: Negative for chills.   HENT: Negative for congestion and ear pain.    Eyes: Negative for pain.   Respiratory: Negative for cough.    Cardiovascular: Negative for chest pain.   Gastrointestinal: Negative for abdominal pain, constipation, diarrhea and hematochezia.   Genitourinary: Negative for  "hematuria.   Neurological: Negative for dizziness.         OBJECTIVE:   /74 (BP Location: Left arm, Patient Position: Chair, Cuff Size: Adult Regular)  Pulse 74  Temp 97.1  F (36.2  C) (Oral)  Ht 5' 9.5\" (1.765 m)  Wt 187 lb (84.8 kg)  SpO2 95%  BMI 27.22 kg/m2 Estimated body mass index is 26.35 kg/(m^2) as calculated from the following:    Height as of 8/29/17: 5' 9.5\" (1.765 m).    Weight as of 4/24/18: 181 lb (82.1 kg).  Physical Exam  GENERAL: healthy, alert and no distress  EYES: Eyes grossly normal to inspection, PERRL and conjunctivae and sclerae normal  HENT: ear canals and TM's normal, nose and mouth without ulcers or lesions  NECK: no adenopathy, no asymmetry, masses, or scars and thyroid normal to palpation  RESP: lungs clear to auscultation - no rales, rhonchi or wheezes  CV: regular rate and rhythm, normal S1 S2, no S3 or S4, no murmur, click or rub, no peripheral edema and peripheral pulses strong  ABDOMEN: soft, nontender, no hepatosplenomegaly, no masses and bowel sounds normal  MS: Tenderness right medial epicondyle and distally. No pain with ROM right elbow or wrist. No tenderness lumbar spinous processes or bilateral lumbar region. Gait intact. Able to heel and toe walk. Negative straight leg raise bilaterally. No tenderness left knee. No patellar instability. Negative laxity. Able to flex and extend without pain or limitation  SKIN: no suspicious lesions or rashes  NEURO: Normal strength and tone, mentation intact and speech normal  PSYCH: mentation appears normal, affect normal/bright    Diagnostic Test Results:  none     ASSESSMENT / PLAN:       ICD-10-CM    1. Screen for colon cancer Z12.11    2. Hypertension goal BP (blood pressure) < 140/90 I10 Lipid panel reflex to direct LDL Fasting     lisinopril-hydrochlorothiazide (PRINZIDE/ZESTORETIC) 10-12.5 MG per tablet     Basic metabolic panel   3. Primary insomnia F51.01 traZODone (DESYREL) 50 MG tablet   4. Left knee pain, " "unspecified chronicity M25.562 XR Knee Left 1/2 Views   5. Left-sided low back pain without sciatica, unspecified chronicity M54.5 XR Lumbar Spine 2/3 Views   6. Medial epicondylitis of elbow, right M77.01 [facility]   7. Screening for prostate cancer Z12.5 PSA, screen     Given compression wrap for medial epicondylitis. Recommend ice, elevation, avoid aggravating activity  PHYSICAL THERAPY for left knee and back pain. Physical exam unremarkable. No neuro deficits.     End of Life Planning:  Patient currently has an advanced directive: No.  I have verified the patient's ablity to prepare an advanced directive/make health care decisions.  Literature was provided to assist patient in preparing an advanced directive.    COUNSELING:  Reviewed preventive health counseling, as reflected in patient instructions       Regular exercise       Healthy diet/nutrition       Prostate cancer screening    BP Readings from Last 1 Encounters:   04/24/18 137/81     Estimated body mass index is 26.35 kg/(m^2) as calculated from the following:    Height as of 8/29/17: 5' 9.5\" (1.765 m).    Weight as of 4/24/18: 181 lb (82.1 kg).           reports that he quit smoking about 5 months ago. His smoking use included Cigarettes. He has never used smokeless tobacco.      Appropriate preventive services were discussed with this patient, including applicable screening as appropriate for cardiovascular disease, diabetes, osteopenia/osteoporosis, and glaucoma.  As appropriate for age/gender, discussed screening for colorectal cancer, prostate cancer, breast cancer, and cervical cancer. Checklist reviewing preventive services available has been given to the patient.    Reviewed patients plan of care and provided an AVS. The Basic Care Plan (routine screening as documented in Health Maintenance) for Kapil meets the Care Plan requirement. This Care Plan has been established and reviewed with the Patient.    Counseling " Resources:  ATP IV Guidelines  Pooled Cohorts Equation Calculator  Breast Cancer Risk Calculator  FRAX Risk Assessment  ICSI Preventive Guidelines  Dietary Guidelines for Americans, 2010  USDA's MyPlate  ASA Prophylaxis  Lung CA Screening    LEV Jim CNP  [facility]  Answers for HPI/ROS submitted by the patient on 9/7/2018   PHQ-2 Score: 0

## 2018-09-10 NOTE — PROGRESS NOTES
Kapil Auguste,    Your lab results have been released to [facility].   Your labs look good. Screening for prostate cancer was negative.   Please call the clinic if you have any concerns [phone].    LEV Jim [facility]

## 2018-09-10 NOTE — PROGRESS NOTES
Kapil Auguste,    Your lab results have been released to [facility].   The radiologist's read of your xrays has been released to [facility].  Did show moderate to severe degenerative change at L5-S1 along with some arthritis at SI joint. I recommend scheduling with physical therapy like we discussed in clinic and then let me know if pain not improving after completing recommended course of physical therapy.   Please call the clinic if you have any concerns [phone].    LEV Jim [facility]

## 2018-09-12 ENCOUNTER — THERAPY VISIT (OUTPATIENT)
Dept: PHYSICAL THERAPY | Facility: CLINIC | Age: 68
End: 2018-09-12
Payer: COMMERCIAL

## 2018-09-12 DIAGNOSIS — M25.521 RIGHT ELBOW PAIN: ICD-10-CM

## 2018-09-12 PROCEDURE — 97161 PT EVAL LOW COMPLEX 20 MIN: CPT | Mod: GP | Performed by: PHYSICAL THERAPIST

## 2018-09-12 PROCEDURE — 97110 THERAPEUTIC EXERCISES: CPT | Mod: GP | Performed by: PHYSICAL THERAPIST

## 2018-09-12 NOTE — PROGRESS NOTES
[facility] Initial Evaluation -- Upper Extremity    Evaluation Date: September 12, 2018  Kapil Auguste is a 68 year old male with a Rt Elbow condition.   Referral: GP  Work mechanical stresses: NA  Employment status:  Retired  Leisure mechanical stresses: Golf 1-2 x week, Exercises daily (Cardio and weights)  Functional disability score (SPADI): NA  VAS score (0-10): 3/10  Handedness (R/L):  Left  Patient goals/expectations:  Reduce pain in elbow    HISTORY    Present symptoms: Rt medial elbow.    Pain quality (sharp/shooting/stabbing/aching/burning/cramping):  Dull ache    Present since (onset date):  March 2018    Symptoms (improving/unchanging/worsening):  Unchanging    Symptoms commenced as a result of: No apparent reason   Condition occurred in the following environment: Home    Symptoms at onset: As above  Paresthesia (yes/no):  No  Spinal history: No   Cough/Sneeze (pos/neg):  Neg    Constant symptoms: None  Intermittent symptoms: As above    Symptoms are worse with the following: Other - Lifting/bending elbow   Symptoms are better with the following: Other - brace, topical analgesic    Continued use makes the pain (better/worse/no effect): No effect    Disturbed night (yes/no):  No    Pain at rest (yes/no): No  Site (neck/shoulder/elbow/wrist/hand): NA    Other questions (swelling/catching/clicking/locking/subluxing):  No    Previous episodes: No  Previous treatments: None    Specific Questions:  General health (excellent/good/fair/poor):  Good  Pertinent medical history includes: High blood pressure and Smoking  Medications (nil/NSAIDS/analg/steroids/anticoag/other):  Other - High blood pressure  Medical allergies:  None  Imaging (None/Xray/MRI/Other): None  Recent or major surgery (yes/no): No  Night pain (yes/no): No  Accidents (yes/no): No  Unexplained weight loss (yes/no): No  Barriers at home: None  Other red flags: None    Sites for physical examination  (neck/shoulder/elbow/wrist/hand): Elbow    EXAMINATION    Posture:  Sitting (good/fair/poor): NT  Correction of posture (better/worse/no effect/NA): NT  Standing (good/fair/poor): NT  Other observations:  NT    Neurological (NA/motor/sensory/reflexes/dural): NT    Baselines (pain or functional activity): Sometime pain with active bending elbow or using ab roller machine    Extremities (Shoulder/Elbow/Wrist/Hand): Elbow    Movement Loss Edmundo Mod Min Nil Pain   Flexion    X    Extension    X    Supination    X    Pronation    X    Radial Deviation    X    Ulnar Deviation    X       Passive Movement (+/- overpressure)/(PDM/ERP):  Min loss Flex w/ERP; Ext Min loss; Pron WNL; Sup WNL  Resisted Test Response (pain): Elbow flex 5/5; Elbow Ext 5/5; Sup 5/5; Pron 5/5; Wrist Ext 5/5; Wrist Flex 5/5  Other Tests: NT    Spine:  Movement Loss: NT  Effect of repeated movements: NT  Effect of static positioning: NT  Spine testing (not relevant/relevant/secondary problem): Not relevant    Baseline Symptoms: ROM as above  Repeated Tests Symptom Response Mechanical Response   Active/Passive movement, resisted test, functional test During - Produce, Abolish, Increase, Decrease, NE After -   Better, Worse, NB, NW, NE Effect -   ? or ? ROM, strength or key functional test No Effect   Repeated elbow ext w/self OP No Effect    No Effect    Inc ROM passive flex and ext.    Dec ERP w/passive flex    Effect of static positioning       NT           Provisional Classification (Extremity/Spine): Extremity - Derangement      Principle of Management:  Education:  Specificity of exercise and rationale behind repeated movements, Test/retest with provoking activities  Equipment provided:  None  Exercise and dosage:  Repeated elbow ext w/self OP x 10-15 reps, 5-6 x daily    ASSESSMENT/PLAN:    Patient is a 68 year old male with right side elbow complaints.    Patient has the following significant findings with corresponding treatment plan.                 Diagnosis 1:  Rt Elbow Pain    Pain -  self management, education, directional preference exercise and home program  Decreased ROM/flexibility - manual therapy, therapeutic exercise and home program  Decreased joint mobility - manual therapy, therapeutic exercise and home program  Impaired muscle performance - neuro re-education and home program    Therapy Evaluation Codes:   1) History comprised of:   Personal factors that impact the plan of care:      None.    Comorbidity factors that impact the plan of care are:      High blood pressure and Smoking.     Medications impacting care: High blood pressure.  2) Examination of Body Systems comprised of:   Body structures and functions that impact the plan of care:      Elbow.   Activity limitations that impact the plan of care are:      Lifting.  3) Clinical presentation characteristics are:   Stable/Uncomplicated.  4) Decision-Making    Low complexity using standardized patient assessment instrument and/or measureable assessment of functional outcome.  Cumulative Therapy Evaluation is: Low complexity.    Previous and current functional limitations:  (See Goal Flow Sheet for this information)    Short term and Long term goals: (See Goal Flow Sheet for this information)     Communication ability:  Patient appears to be able to clearly communicate and understand verbal and written communication and follow directions correctly.  Treatment Explanation - The following has been discussed with the patient:   RX ordered/plan of care  Anticipated outcomes  Possible risks and side effects  This patient would benefit from PT intervention to resume normal activities.   Rehab potential is good.    Frequency:  2 X a month, once daily  Duration:  for 2 months  Discharge Plan:  Achieve all LTG.  Independent in home treatment program.  Reach maximal therapeutic benefit.    Please refer to the daily flowsheet for treatment today, total treatment time and time spent performing 1:1  timed codes.

## 2018-09-25 ENCOUNTER — THERAPY VISIT (OUTPATIENT)
Dept: PHYSICAL THERAPY | Facility: CLINIC | Age: 68
End: 2018-09-25
Payer: COMMERCIAL

## 2018-09-25 DIAGNOSIS — G89.29 CHRONIC PAIN OF LEFT KNEE: ICD-10-CM

## 2018-09-25 DIAGNOSIS — G89.29 CHRONIC LEFT-SIDED LOW BACK PAIN WITHOUT SCIATICA: Primary | ICD-10-CM

## 2018-09-25 DIAGNOSIS — M25.562 CHRONIC PAIN OF LEFT KNEE: ICD-10-CM

## 2018-09-25 DIAGNOSIS — M54.50 CHRONIC LEFT-SIDED LOW BACK PAIN WITHOUT SCIATICA: Primary | ICD-10-CM

## 2018-09-25 PROCEDURE — 97530 THERAPEUTIC ACTIVITIES: CPT | Mod: GP | Performed by: PHYSICAL THERAPIST

## 2018-09-25 PROCEDURE — 97110 THERAPEUTIC EXERCISES: CPT | Mod: GP | Performed by: PHYSICAL THERAPIST

## 2018-09-25 PROCEDURE — 97161 PT EVAL LOW COMPLEX 20 MIN: CPT | Mod: GP | Performed by: PHYSICAL THERAPIST
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
## 2018-10-02 ENCOUNTER — THERAPY VISIT (OUTPATIENT)
Dept: PHYSICAL THERAPY | Facility: CLINIC | Age: 68
End: 2018-10-02
Payer: COMMERCIAL

## 2018-10-02 DIAGNOSIS — M25.562 CHRONIC PAIN OF LEFT KNEE: ICD-10-CM

## 2018-10-02 DIAGNOSIS — M54.50 CHRONIC LEFT-SIDED LOW BACK PAIN WITHOUT SCIATICA: ICD-10-CM

## 2018-10-02 DIAGNOSIS — G89.29 CHRONIC PAIN OF LEFT KNEE: ICD-10-CM

## 2018-10-02 DIAGNOSIS — G89.29 CHRONIC LEFT-SIDED LOW BACK PAIN WITHOUT SCIATICA: ICD-10-CM

## 2018-10-02 DIAGNOSIS — M25.521 RIGHT ELBOW PAIN: ICD-10-CM

## 2018-10-02 PROCEDURE — 97530 THERAPEUTIC ACTIVITIES: CPT | Mod: GP | Performed by: PHYSICAL THERAPIST

## 2018-10-02 PROCEDURE — 97110 THERAPEUTIC EXERCISES: CPT | Mod: GP | Performed by: PHYSICAL THERAPIST

## 2018-10-02 NOTE — PROGRESS NOTES
PROGRESS  REPORT    Progress reporting period is from 9.12.18 to 10.2.18.       SUBJECTIVE  Subjective changes noted by patient:  Pt reports that elbow and back pain is better.  Has not noticed any pain in knee since last week.    Current Pain level: 0/10.     Initial Pain level: 4/10.   Changes in function:  Yes (See Goal flowsheet attached for changes in current functional level)  Adverse reaction to treatment or activity: None    OBJECTIVE  Objective: Elbow ROM:  WNL all planes.  L/S AROM:  Flex WNL; Ext WNL; SG WNL (B).      ASSESSMENT/PLAN  Updated problem list and treatment plan:   Diagnosis 1:  Rt Elbow Pain  Diagnosis 2:  LBP     Pain -  self management, education, directional preference exercise and home program  Decreased ROM/flexibility - manual therapy, therapeutic exercise and home program  Decreased joint mobility - manual therapy, therapeutic exercise and home program  Impaired muscle performance - neuro re-education and home program  Impaired posture - neuro re-education and home program  STG/LTGs have been met or progress has been made towards goals:  Yes (See Goal flow sheet completed today.)  Assessment of Progress: The patient's condition is improving.  Self Management Plans:  Patient is independent in a home treatment program.  Patient is independent in self management of symptoms.  I have re-evaluated this patient and find that the nature, scope, duration and intensity of the therapy is appropriate for the medical condition of the patient.  Kapil continues to require the following intervention to meet STG and LTG's:  PT intervention is no longer required to meet STG/LTG.    Recommendations:  Pt will continue with HEP as instructed.  Follow up prn over the next 6 weeks if problems arise/symptoms worsen.  D/C at that time if pt does not return and this progress note to serve as D/C status.

## 2019-02-12 ENCOUNTER — OFFICE VISIT (OUTPATIENT)
Dept: FAMILY MEDICINE | Facility: CLINIC | Age: 69
End: 2019-02-12
Payer: COMMERCIAL

## 2019-02-12 VITALS
OXYGEN SATURATION: 96 % | BODY MASS INDEX: 25.77 KG/M2 | WEIGHT: 180 LBS | HEIGHT: 70 IN | DIASTOLIC BLOOD PRESSURE: 74 MMHG | HEART RATE: 98 BPM | SYSTOLIC BLOOD PRESSURE: 125 MMHG | TEMPERATURE: 97 F

## 2019-02-12 DIAGNOSIS — Z12.11 SCREEN FOR COLON CANCER: Primary | ICD-10-CM

## 2019-02-12 DIAGNOSIS — Z13.6 SCREENING FOR AAA (ABDOMINAL AORTIC ANEURYSM): ICD-10-CM

## 2019-02-12 DIAGNOSIS — Z87.891 HISTORY OF SMOKING: ICD-10-CM

## 2019-02-12 PROCEDURE — 99213 OFFICE O/P EST LOW 20 MIN: CPT | Performed by: NURSE PRACTITIONER

## 2019-02-12 ASSESSMENT — MIFFLIN-ST. JEOR: SCORE: 1574.78

## 2019-02-12 NOTE — PROGRESS NOTES
SUBJECTIVE:   Kapil Auguste is a 68 year old male who presents to clinic today for the following health issues:        Was on Elliptical 10 days ago, had some pain in right testical No pain since then, but has not exercised  Normal screening PSA Sep 2018  Denies urinary symptoms  Denies fever, chills, nausea, vomiting  No drainage from sore    Smoking history  Quit last year    Problem list and histories reviewed & adjusted, as indicated.  Additional history: as documented    Patient Active Problem List   Diagnosis     Advanced directives, counseling/discussion     Adjustment disorder     CARDIOVASCULAR SCREENING; LDL GOAL LESS THAN 130     Hypertension goal BP (blood pressure) < 140/90     Right elbow pain     Chronic left-sided low back pain without sciatica     Chronic pain of left knee     History reviewed. No pertinent surgical history.    Social History     Tobacco Use     Smoking status: Former Smoker     Types: Cigarettes     Last attempt to quit: 3/20/2018     Years since quittin.9     Smokeless tobacco: Never Used   Substance Use Topics     Alcohol use: Yes     Comment: 3 shots per week      Family History   Problem Relation Age of Onset     Diabetes Mother      Hypertension Mother      Heart Disease Father      Hypertension Father      Cancer Paternal Uncle         pamcreatic         Current Outpatient Medications   Medication Sig Dispense Refill     fish oil-omega-3 fatty acids 1000 MG capsule Take 1 g by mouth daily       lisinopril-hydrochlorothiazide (PRINZIDE/ZESTORETIC) 10-12.5 MG per tablet Take 1 tablet by mouth daily 90 tablet 3     Loratadine (CLARITIN) 10 MG capsule Take 10 mg by mouth daily. As needed         Lysine 500 MG TABS Take 1 tablet by mouth daily       traZODone (DESYREL) 50 MG tablet Take 0.5-1 tablets (25-50 mg) by mouth nightly as needed for sleep 60 tablet 1     ORDER FOR DME BP monitor device 1 each 0     valACYclovir (VALTREX) 1000 mg tablet Take 2 tablets (2,000 mg)  "by mouth 2 times daily (Patient not taking: Reported on 2/12/2019) 4 tablet 0       Reviewed and updated as needed this visit by clinical staff  Tobacco  Allergies  Meds  Med Hx  Surg Hx  Fam Hx  Soc Hx      Reviewed and updated as needed this visit by Provider         ROS:  Constitutional, HEENT, cardiovascular, pulmonary, gi and gu systems are negative, except as otherwise noted.    OBJECTIVE:     /74 (BP Location: Right arm, Patient Position: Sitting, Cuff Size: Adult Regular)   Pulse 98   Temp 97  F (36.1  C) (Oral)   Ht 1.765 m (5' 9.5\")   Wt 81.6 kg (180 lb)   SpO2 96%   BMI 26.20 kg/m    Body mass index is 26.2 kg/m .  GENERAL: healthy, alert and no distress   (male): small < 1 cm soft, tender soft tissue mass at base of scrotum near rectum  normal male genitalia, urethral discharge, no hernia  RECTAL (male): normal sphincter tone, no rectal masses, prostate normal size, smooth, nontender without nodules or masses    Diagnostic Test Results:  none     ASSESSMENT/PLAN:       ICD-10-CM    1. Screen for colon cancer Z12.11    2. Screening for AAA (abdominal aortic aneurysm) Z13.6 Abdominal Aortic Aneurysm Screening/Tracking     US Abdominal Aorta Limited   3. History of smoking Z87.891 US Abdominal Aorta Limited     I performed a rectal exam as he initially stated he felt a prostate mass. I was able to feel the base of the prostate which was normal. He then clarified that it was not a prostate mass he felt. He did have small soft tissue mass which I believe is a cyst at the posterior base of the scrotum near his rectum. Genital exam otherwise normal. The pain is improving and I don't think any additional follow up is needed. He will call the clinic if it is increasing in size, has drainage, or pain does not resolve over the next few weeks  Patient Instructions   Check with insurance on cost of Shingrix. If you want it, you can schedule an ancillary visit       LEV Jim " [facility]

## 2019-02-14 ENCOUNTER — MYC MEDICAL ADVICE (OUTPATIENT)
Dept: FAMILY MEDICINE | Facility: CLINIC | Age: 69
End: 2019-02-14

## 2019-02-14 DIAGNOSIS — Z23 NEED FOR SHINGLES VACCINE: Primary | ICD-10-CM

## 2019-02-14 NOTE — TELEPHONE ENCOUNTER
Routed patient's Mychart request to Shadia to send order for shingles vaccine to outside pharmacy.  Sherice Narayan RN  [facility]

## 2019-02-18 ENCOUNTER — ANCILLARY PROCEDURE (OUTPATIENT)
Dept: ULTRASOUND IMAGING | Facility: CLINIC | Age: 69
End: 2019-02-18
Attending: NURSE PRACTITIONER
Payer: COMMERCIAL

## 2019-02-18 DIAGNOSIS — Z13.6 SCREENING FOR AAA (ABDOMINAL AORTIC ANEURYSM): ICD-10-CM

## 2019-02-18 DIAGNOSIS — Z87.891 HISTORY OF SMOKING: ICD-10-CM

## 2019-02-18 PROCEDURE — 76775 US EXAM ABDO BACK WALL LIM: CPT

## 2019-02-18 NOTE — RESULT ENCOUNTER NOTE
Sawyer,  Thank you for scheduling your abdominal aorta ultra sound. It looked normal. No further imaging needed in the future.   Shadia Foote, CNP

## 2019-03-11 ENCOUNTER — OFFICE VISIT (OUTPATIENT)
Dept: FAMILY MEDICINE | Facility: CLINIC | Age: 69
End: 2019-03-11
Payer: COMMERCIAL

## 2019-03-11 VITALS
TEMPERATURE: 97.2 F | HEIGHT: 70 IN | SYSTOLIC BLOOD PRESSURE: 107 MMHG | DIASTOLIC BLOOD PRESSURE: 69 MMHG | HEART RATE: 78 BPM | BODY MASS INDEX: 25.91 KG/M2 | OXYGEN SATURATION: 95 % | WEIGHT: 181 LBS

## 2019-03-11 DIAGNOSIS — N50.811 PAIN IN RIGHT TESTICLE: Primary | ICD-10-CM

## 2019-03-11 DIAGNOSIS — Z87.891 PERSONAL HISTORY OF TOBACCO USE: ICD-10-CM

## 2019-03-11 DIAGNOSIS — Z87.891 PERSONAL HISTORY OF TOBACCO USE, PRESENTING HAZARDS TO HEALTH: ICD-10-CM

## 2019-03-11 LAB
ALBUMIN UR-MCNC: NEGATIVE MG/DL
APPEARANCE UR: CLEAR
BILIRUB UR QL STRIP: NEGATIVE
COLOR UR AUTO: YELLOW
GLUCOSE UR STRIP-MCNC: NEGATIVE MG/DL
HGB UR QL STRIP: NEGATIVE
KETONES UR STRIP-MCNC: NEGATIVE MG/DL
LEUKOCYTE ESTERASE UR QL STRIP: NEGATIVE
NITRATE UR QL: NEGATIVE
PH UR STRIP: 5.5 PH (ref 5–7)
SOURCE: NORMAL
SP GR UR STRIP: 1.01 (ref 1–1.03)
UROBILINOGEN UR STRIP-ACNC: 0.2 EU/DL (ref 0.2–1)

## 2019-03-11 PROCEDURE — G0296 VISIT TO DETERM LDCT ELIG: HCPCS | Performed by: NURSE PRACTITIONER

## 2019-03-11 PROCEDURE — 99213 OFFICE O/P EST LOW 20 MIN: CPT | Performed by: NURSE PRACTITIONER

## 2019-03-11 PROCEDURE — 81003 URINALYSIS AUTO W/O SCOPE: CPT | Performed by: NURSE PRACTITIONER

## 2019-03-11 ASSESSMENT — MIFFLIN-ST. JEOR: SCORE: 1579.32

## 2019-03-11 ASSESSMENT — PAIN SCALES - GENERAL: PAINLEVEL: MODERATE PAIN (5)

## 2019-03-11 NOTE — PROGRESS NOTES
"  SUBJECTIVE:   Kapil Auguste is a 68 year old male who presents to clinic today for the following health issues:      Patient is here today to follow up on sore testicles, still sore and he thinks he may have Epididymitis.    Pain to right testicle  Started 3 weeks ago  No swelling  Denies dysuria, LUTS, penile discharge    Pain was improving then returned  Only feels with exertion \"shoveling, sometimes walking, heavy lifting\"    I saw him  for testicular pain which was identified as cyst near rectum. That is unrelated to current pain    Quit smoking   15 pack year history      Problem list and histories reviewed & adjusted, as indicated.  Additional history: as documented    Patient Active Problem List   Diagnosis     Advanced directives, counseling/discussion     Adjustment disorder     CARDIOVASCULAR SCREENING; LDL GOAL LESS THAN 130     Hypertension goal BP (blood pressure) < 140/90     Right elbow pain     Chronic left-sided low back pain without sciatica     Chronic pain of left knee     History reviewed. No pertinent surgical history.    Social History     Tobacco Use     Smoking status: Former Smoker     Types: Cigarettes     Last attempt to quit: 3/20/2018     Years since quittin.9     Smokeless tobacco: Never Used   Substance Use Topics     Alcohol use: Yes     Comment: 3 shots per week      Family History   Problem Relation Age of Onset     Diabetes Mother      Hypertension Mother      Heart Disease Father      Hypertension Father      Cancer Paternal Uncle         pamcreatic           Reviewed and updated as needed this visit by clinical staff  Tobacco  Allergies  Meds  Med Hx  Surg Hx  Fam Hx  Soc Hx      Reviewed and updated as needed this visit by Provider         ROS:  Constitutional, HEENT, cardiovascular, pulmonary, gi and gu systems are negative, except as otherwise noted.    OBJECTIVE:     /69 (BP Location: Right arm, Patient Position: Chair, Cuff Size: Adult " "Regular)   Pulse 78   Temp 97.2  F (36.2  C) (Oral)   Ht 1.765 m (5' 9.5\")   Wt 82.1 kg (181 lb)   SpO2 95%   BMI 26.35 kg/m    Body mass index is 26.35 kg/m .  GENERAL: healthy, alert and no distress   (male): normal male genitalia without lesions or urethral discharge, no hernia    Diagnostic Test Results:  Results for orders placed or performed in visit on 03/11/19 (from the past 24 hour(s))   *UA reflex to Microscopic and Culture ([place] [facility] (except [place] and [place])   Result Value Ref Range    Color Urine Yellow     Appearance Urine Clear     Glucose Urine Negative NEG^Negative mg/dL    Bilirubin Urine Negative NEG^Negative    Ketones Urine Negative NEG^Negative mg/dL    Specific Gravity Urine 1.015 1.003 - 1.035    Blood Urine Negative NEG^Negative    pH Urine 5.5 5.0 - 7.0 pH    Protein Albumin Urine Negative NEG^Negative mg/dL    Urobilinogen Urine 0.2 0.2 - 1.0 EU/dL    Nitrite Urine Negative NEG^Negative    Leukocyte Esterase Urine Negative NEG^Negative    Source Midstream Urine        ASSESSMENT/PLAN:       ICD-10-CM    1. Pain in right testicle N50.811 *UA reflex to Microscopic and Culture ([place] [facility] (except [place] and [place])   2. Personal history of tobacco use, presenting hazards to health Z87.891 Prof Fee: Shared Decision Making Visit for Lung Cancer Screening   3. Personal history of tobacco use Z87.891      Discussed lung cancer screening  Inadequate smoking history    Patient Instructions  Your physical exam was normal   Your urine test was normal  It is possible your pain is related to an inguinal hernia that is aggravated when you are exerting yourself, but I did not detect a hernia on examination    I think it is ok to monitor your symptoms for a few weeks. If your pain does not resolve over the next 2-3 weeks please let me know and I will order a scrotal ultra sound for you      See additional Patient Instructions    LEV Jim " [facility]  Lung Cancer Screening Shared Decision Making Visit     Kapil Auguste is not eligible for lung cancer screening on the basis of the information provided in my signed lung cancer screening order. Kapil's smoking history is below the threshold and so it is not recommended.    Patient is not currently a smoker and so we did discuss that the only way to prevent lung cancer is to not smoke. Smoking cessation assistance was not appropriate.    ShouldIScreen

## 2019-03-11 NOTE — PATIENT INSTRUCTIONS
Your physical exam was normal   Your urine test was normal  It is possible your pain is related to an inguinal hernia that is aggravated when you are exerting yourself, but I did not detect a hernia on examination    I think it is ok to monitor your symptoms for a few weeks. If your pain does not resolve over the next 2-3 weeks please let me know and I will order a scrotal ultra sound for you    Testicular Pain, Unclear Cause  You have had pain in one or both testicles. Based on your exam today, the exact cause of your pain is not certain. But your condition does not appear to be dangerous. Testicles are very sensitive. Even a small injury can cause quite a bit of pain. Other possible causes of testicular pain include kidney stones, cysts, mumps, inflammatory conditions, chronic conditions, hernia, infection, and a twisted testicle.  Certain tests may be done to rule out an underlying problem causing the pain. Nothing conclusive was found today. Most likely, the pain will go away on its own. If it doesn t, you may need more tests.    Home care  Medicine may be prescribed to help relieve pain and swelling. This may be an over-the-counter pain reliever or prescription pain medication. Take all medicine as directed.  The following are general care guidelines:    To relieve pain and swelling, apply an ice pack wrapped in a thin towel for 10 minutes at a time. Continue this on and off for 1 to 2 days.    When lying down, place a small rolled towel under your scrotum. When moving around, wear a jockstrap (athletic supporter) or supportive underwear. These will help support and protect your testicles.    If it hurts to walk, walk as little as possible until you feel better.    Avoid strenuous activity until you feel better.    Do not have sex until you feel better.    If you have severe pain in the testicle, seek care right away. Delay may lead to permanent loss of the testicle s function.  Follow-up care  Follow up with  your healthcare provider, or as advised.  When to seek medical advice  Call your healthcare provider right away if any of these occur:    Fever of 100.4 F (38 C) or higher    Worsening of the pain or severe pain    Swelling of the testicle or scrotum    A lump in the scrotum    Warm and red scrotum (signs of infection)    Nausea and vomiting    Pain or swelling in abdomen    Trouble urinating    Numbness or weakness in the leg    Shrinking of the testicle    Blood in your urine  Date Last Reviewed: 10/1/2016    0111-3223 [employer]. [address] [place], [place] [address]. All rights reserved. This information is not intended as a substitute for professional medical care. Always follow your healthcare professional's instructions.

## 2019-04-06 ENCOUNTER — MYC MEDICAL ADVICE (OUTPATIENT)
Dept: FAMILY MEDICINE | Facility: CLINIC | Age: 69
End: 2019-04-06

## 2019-04-06 DIAGNOSIS — N50.811 PAIN IN RIGHT TESTICLE: Primary | ICD-10-CM

## 2019-04-08 NOTE — TELEPHONE ENCOUNTER
Routing to PCP to review and advise.    Please see My Chart message.      Debora Marie RN  [facility]

## 2019-04-23 ENCOUNTER — OFFICE VISIT (OUTPATIENT)
Dept: UROLOGY | Facility: CLINIC | Age: 69
End: 2019-04-23
Payer: COMMERCIAL

## 2019-04-23 VITALS — SYSTOLIC BLOOD PRESSURE: 118 MMHG | DIASTOLIC BLOOD PRESSURE: 72 MMHG | RESPIRATION RATE: 14 BRPM | HEART RATE: 75 BPM

## 2019-04-23 DIAGNOSIS — R10.2 PELVIC PAIN IN MALE: Primary | ICD-10-CM

## 2019-04-23 PROCEDURE — 99203 OFFICE O/P NEW LOW 30 MIN: CPT | Performed by: UROLOGY

## 2019-04-23 NOTE — PROGRESS NOTES
S: patient is a pleasant 68-year-old male who is request be seen by Marlin Foote for a consultation with regard to patient's scrotal discomfort more than right than left.  Patient has had pain in his scrotal area for about 6 months.  Pain comes and goes.  It is mild in nature.  Pain is worse with activities (especially snow shovel) and better with rest.  He has no urination issues.  He has no penile discharge.  He has no fever, nausea or vomiting.  Recent psa was 1.19.    Current Outpatient Medications   Medication Sig Dispense Refill     lisinopril-hydrochlorothiazide (PRINZIDE/ZESTORETIC) 10-12.5 MG per tablet Take 1 tablet by mouth daily 90 tablet 3     traZODone (DESYREL) 50 MG tablet Take 0.5-1 tablets (25-50 mg) by mouth nightly as needed for sleep 60 tablet 1     valACYclovir (VALTREX) 1000 mg tablet Take 2 tablets (2,000 mg) by mouth 2 times daily 4 tablet 0     No Known Allergies  Past Medical History:   Diagnosis Date     Hiatal hernia     seen on endoscopy      Past Surgical History:   Procedure Laterality Date     HYDROCELECTOMY SCROTAL  2013      Family History   Problem Relation Age of Onset     Diabetes Mother      Hypertension Mother      Heart Disease Father      Hypertension Father      Cancer Paternal Uncle         pamcreatic     Social History     Socioeconomic History     Marital status:      Spouse name: None     Number of children: None     Years of education: None     Highest education level: None   Occupational History     None   Social Needs     Financial resource strain: None     Food insecurity:     Worry: None     Inability: None     Transportation needs:     Medical: None     Non-medical: None   Tobacco Use     Smoking status: Former Smoker     Packs/day: 0.50     Years: 30.00     Pack years: 15.00     Types: Cigarettes     Last attempt to quit: 2019     Years since quittin.2     Smokeless tobacco: Never Used   Substance and Sexual Activity     Alcohol use: Yes      Comment: 3 shots per week      Drug use: No     Sexual activity: Not Currently     Partners: Female   Lifestyle     Physical activity:     Days per week: None     Minutes per session: None     Stress: None   Relationships     Social connections:     Talks on phone: None     Gets together: None     Attends Oriental orthodox service: None     Active member of club or organization: None     Attends meetings of clubs or organizations: None     Relationship status: None     Intimate partner violence:     Fear of current or ex partner: None     Emotionally abused: None     Physically abused: None     Forced sexual activity: None   Other Topics Concern     Parent/sibling w/ CABG, MI or angioplasty before 65F 55M? No   Social History Narrative     None       REVIEW OF SYSTEMS  =================  C: NEGATIVE for fever, chills, change in weight  I: NEGATIVE for worrisome rashes, moles or lesions  E/M: NEGATIVE for ear, mouth and throat problems  R: NEGATIVE for significant cough or SHORTNESS OF BREATH  CV:  NEGATIVE for chest pain, palpitations or peripheral edema  GI: NEGATIVE for nausea, abdominal pain, heartburn, or change in bowel habits  NEURO: NEGATIVE numbness/weakness  : see HPI  PSYCH: NEGATIVE depression/anxiety  LYmph: no new enlarged lymph nodes  Ortho: no new trauma/movements      Physical Exam:  /72   Pulse 75   Resp 14    Patient is pleasant, in no acute distress, good general condition.  Heart:  negative, PMI normal  Lung: no evidence of respiratory distress    Abdomen: Soft, nondistended, non tender. No masses. No rebound or guarding.   Exam: penis no discharge. Testis no masses.  No scrotal skin lesion. No inguinal hernia.  Skin: Warm and dry.  No redness.  Neuro: grossly normal  Musculaskeletal: moving all extremities  Psych normal mood and affect  Musculoskeletal  moving all extremities  Hematologic/Lymphatic/Immunologic: normal ant/post cervical, axillary, supraclavicular and inguinal  nodes    Assessment/Plan:   Pleasant 68-year-old male with scrotal discomfort intermittently most likely due to pelvic muscle issues.  Examination today is unremarkable.    Patient was reassured.  Follow-up with me as needed.

## 2019-05-20 ENCOUNTER — TELEPHONE (OUTPATIENT)
Dept: UROLOGY | Facility: CLINIC | Age: 69
End: 2019-05-20

## 2019-05-20 DIAGNOSIS — R10.2 PELVIC PAIN IN MALE: Primary | ICD-10-CM

## 2019-05-20 NOTE — TELEPHONE ENCOUNTER
Patient calling in to see if there is something he can do to help with the pain in his pelvic area

## 2019-05-21 NOTE — TELEPHONE ENCOUNTER
Called and left message for patient to return call to [phone].    Akhil Oquendo RN....5/21/2019 1:07 PM

## 2019-06-14 ENCOUNTER — THERAPY VISIT (OUTPATIENT)
Dept: PHYSICAL THERAPY | Facility: CLINIC | Age: 69
End: 2019-06-14
Attending: UROLOGY
Payer: COMMERCIAL

## 2019-06-14 DIAGNOSIS — R10.2 PELVIC PAIN IN MALE: ICD-10-CM

## 2019-06-14 DIAGNOSIS — M54.41 CHRONIC RIGHT-SIDED LOW BACK PAIN WITH RIGHT-SIDED SCIATICA: ICD-10-CM

## 2019-06-14 DIAGNOSIS — G89.29 CHRONIC RIGHT-SIDED LOW BACK PAIN WITH RIGHT-SIDED SCIATICA: ICD-10-CM

## 2019-06-14 DIAGNOSIS — S76.211S GROIN STRAIN, RIGHT, SEQUELA: ICD-10-CM

## 2019-06-14 PROBLEM — S76.219A GROIN STRAIN: Status: ACTIVE | Noted: 2019-06-14

## 2019-06-14 PROCEDURE — 97110 THERAPEUTIC EXERCISES: CPT | Mod: GP | Performed by: PHYSICAL THERAPIST

## 2019-06-14 PROCEDURE — 97530 THERAPEUTIC ACTIVITIES: CPT | Mod: GP | Performed by: PHYSICAL THERAPIST

## 2019-06-14 PROCEDURE — 97161 PT EVAL LOW COMPLEX 20 MIN: CPT | Mod: GP | Performed by: PHYSICAL THERAPIST

## 2019-06-14 NOTE — Clinical Note
Please see the evaluation report completed in PT today.  Thank you for referring Sawyer to us!Cordially, RADHA Baldwin, MPT

## 2019-06-14 NOTE — PROGRESS NOTES
[facility] Initial Evaluation  Subjective:  The history is provided by the patient.   Kapil Auguste is a 68 year old male with a other (groin/scrotal pain) condition.  Condition occurred with:  Insidious onset.  Condition occurred: for unknown reasons.  This is a chronic condition  Pt notes that his R scrotal pain started around Dec 2018, around the time he had to start shoveling snow.  Lately he has not had as much pain though. He still gets a little increase in his pain after working out on his elliptical machine. He did consult with the urologist for this on 4/23/19 and was referred to PT.  He denies any urinary problems that have started since getting his scrotal pain, although he does have occasional hemorrhoids. He also denies having constipation or other bowel issues, and has taken fiber 2x/day for years now..    Patient reports pain:  Groin, lumbar spine right and lower lumbar spine (R inner groin).    Pain is described as sharp and is intermittent Pain Scale: 0/10 currently, 6/10 at worst.  Associated symptoms:  Tingling (R inner thigh). Pain is the same all the time.  Exacerbated by: shoveling, elliptical. and relieved by rest.  Since onset symptoms are gradually improving.  Special tests:  X-ray (in 2018).  Previous treatment includes physical therapy (for LB in Sept 2018).  There was moderate improvement following previous treatment.  General health as reported by patient is excellent.  Pertinent medical history includes:  High blood pressure and smoking.  Medical allergies: none.  Other surgeries include:  None.  Current medications:  High blood pressure medication.  Current occupation is retired.    Primary job tasks include:  Driving, lifting, repetitive tasks and prolonged sitting (household chores, golfing).    Barriers include:  None as reported by the patient.    Red flags:  None as reported by the patient.                        Objective:  System         Lumbar/SI  Evaluation  ROM:    AROM Lumbar:   Flexion:          70% of NL  Ext:                    40%   Side Bend:        Left:  75%    Right:  75%  Rotation:           Left:     Right:   Side Glide:        Left:     Right:                           Spinal Segmental Conclusions: Significantly restricted at L3 through S1 levels, mod'ly to signif'ly restricted at L1 to L3 levels w/P-A mob's.  Sustained compression to R transverse processes did elicit slight pain in saddle region.          SI joint/Sacrum:    Neg testing in standing, but in prone R sacral sulcus appears more shallow than L, and no change in depth w/POEs.  Provocation:  Positive SURYA for groin pain only    Left negative at:    Squish  Right positive at:    Squish  Right negative at:  Forward bend standing                                                   [place] Lumbar Evaluation    Posture:  Sitting: poor  Standing: fair  Lordosis: Reduced  Lateral Shift: no  Correction of Posture: no effect                                                   ROS    Assessment/Plan:    Patient is a 68 year old male with lumbar complaints.    Patient has the following significant findings with corresponding treatment plan.                Diagnosis 1:  Lumbar DJD/DDD w/saddle region radiculopathy  Pain -  hot/cold therapy, mechanical traction, education, directional preference exercise and home program  Decreased ROM/flexibility - manual therapy and therapeutic exercise  Decreased joint mobility - therapeutic exercise  Decreased strength - therapeutic exercise and therapeutic activities  Impaired muscle performance - neuro re-education  Decreased function - therapeutic activities  Impaired posture - neuro re-education and therapeutic activities    Therapy Evaluation Codes:   1) History comprised of:   Personal factors that impact the plan of care:      Past/current experiences and Time since onset of symptoms.    Comorbidity factors that impact the plan of care are:      High  blood pressure.     Medications impacting care: High blood pressure.  2) Examination of Body Systems comprised of:   Body structures and functions that impact the plan of care:      Hip, Lumbar spine and groin.   Activity limitations that impact the plan of care are:      shoveling, elliptical machine.  3) Clinical presentation characteristics are:   Stable/Uncomplicated.  4) Decision-Making    Low complexity using standardized patient assessment instrument and/or measureable assessment of functional outcome.  Cumulative Therapy Evaluation is: Low complexity.    Previous and current functional limitations:  (See Goal Flow Sheet for this information)    Short term and Long term goals: (See Goal Flow Sheet for this information)     Communication ability:  Patient appears to be able to clearly communicate and understand verbal and written communication and follow directions correctly.  Treatment Explanation - The following has been discussed with the patient:   RX ordered/plan of care  Anticipated outcomes  Possible risks and side effects  This patient would benefit from PT intervention to resume normal activities.   Rehab potential is excellent.    Frequency:  1 X week, once daily  Duration:  for 4 weeks  Discharge Plan:  Achieve all LTG.  Independent in home treatment program.  Reach maximal therapeutic benefit.    Please refer to the daily flowsheet for treatment today, total treatment time and time spent performing 1:1 timed codes.

## 2019-06-18 ENCOUNTER — THERAPY VISIT (OUTPATIENT)
Dept: PHYSICAL THERAPY | Facility: CLINIC | Age: 69
End: 2019-06-18
Payer: COMMERCIAL

## 2019-06-18 DIAGNOSIS — G89.29 CHRONIC RIGHT-SIDED LOW BACK PAIN WITH RIGHT-SIDED SCIATICA: ICD-10-CM

## 2019-06-18 DIAGNOSIS — S76.211S GROIN STRAIN, RIGHT, SEQUELA: ICD-10-CM

## 2019-06-18 DIAGNOSIS — M54.41 CHRONIC RIGHT-SIDED LOW BACK PAIN WITH RIGHT-SIDED SCIATICA: ICD-10-CM

## 2019-06-18 DIAGNOSIS — F51.01 PRIMARY INSOMNIA: ICD-10-CM

## 2019-06-18 PROCEDURE — 97140 MANUAL THERAPY 1/> REGIONS: CPT | Mod: GP | Performed by: PHYSICAL THERAPIST

## 2019-06-18 PROCEDURE — 97110 THERAPEUTIC EXERCISES: CPT | Mod: GP | Performed by: PHYSICAL THERAPIST

## 2019-06-18 RX ORDER — TRAZODONE HYDROCHLORIDE 50 MG/1
25-50 TABLET, FILM COATED ORAL
Qty: 60 TABLET | Refills: 1 | Status: SHIPPED | OUTPATIENT
Start: 2019-06-18 | End: 2020-02-24

## 2019-06-18 NOTE — TELEPHONE ENCOUNTER
Reason for Call:  Medication or medication refill:    Do you use a [facility]?  Name of the pharmacy and phone number for the current request:  [facility] [id number], [facility], [phone]    Name of the medication requested: traZODone (DESYREL) 50 MG tablet    Other request: Azucena from [facility] called and wanted to check status of this prescription.  There was nothing on file.  Would like to request this prescription for patient    Can we leave a detailed message on this number? Not Applicable     Please call Azucena at [facility] with questions at number [phone]    Best Time: Any time    Thank you!  Rosa GILLESPIE  Patient Representative  [facility]        Call taken on 6/18/2019 at 1:13 PM by Rosa Armstrong

## 2019-06-25 ENCOUNTER — THERAPY VISIT (OUTPATIENT)
Dept: PHYSICAL THERAPY | Facility: CLINIC | Age: 69
End: 2019-06-25
Payer: COMMERCIAL

## 2019-06-25 DIAGNOSIS — S76.211S GROIN STRAIN, RIGHT, SEQUELA: ICD-10-CM

## 2019-06-25 DIAGNOSIS — G89.29 CHRONIC RIGHT-SIDED LOW BACK PAIN WITH RIGHT-SIDED SCIATICA: ICD-10-CM

## 2019-06-25 DIAGNOSIS — M54.41 CHRONIC RIGHT-SIDED LOW BACK PAIN WITH RIGHT-SIDED SCIATICA: ICD-10-CM

## 2019-06-25 PROCEDURE — 97110 THERAPEUTIC EXERCISES: CPT | Mod: GP | Performed by: PHYSICAL THERAPIST

## 2019-06-25 PROCEDURE — 97112 NEUROMUSCULAR REEDUCATION: CPT | Mod: GP | Performed by: PHYSICAL THERAPIST

## 2019-06-25 PROCEDURE — 97140 MANUAL THERAPY 1/> REGIONS: CPT | Mod: GP | Performed by: PHYSICAL THERAPIST

## 2019-06-25 NOTE — PROGRESS NOTES
Subjective:  HPI  Oswestry Score: 2 %                 Objective:  System    Physical Exam    General     ROS    Assessment/Plan:    DISCHARGE REPORT    Progress reporting period is from 6/14/19 to 6/25/19.       SUBJECTIVE  Subjective changes noted by patient:  Pt states that he has not had any of his testicular or groin pain for the past 2 wks, even with golfing and doing the elliptical workouts at his NL pace and time.    Current pain level is  0/10.     Previous pain level was   Initial Pain level: 6/10(at worst).   Changes in function:  Yes (See Goal flowsheet attached for changes in current functional level)  Adverse reaction to treatment or activity: None    OBJECTIVE  Changes noted in objective findings:    Objective: No tightness at proximal end of adductor, but still very tight/knotted at distal end (R thigh). Pt able to abduct R to get into SURYA position, no pain.  Continues to have weakness in R adductors as observed eccentrically w/clams and concentrically w/SLR adduction.     ASSESSMENT/PLAN  Updated problem list and treatment plan: Diagnosis 1:  R groin strain  Decreased ROM/flexibility - home program  Decreased strength - home program  STG/LTGs have been met or progress has been made towards goals:  Yes (See Goal flow sheet completed today.)  Assessment of Progress: The patient has met all of their long term goals.  Self Management Plans:  Patient has been instructed in a home treatment program.  Patient is independent in a home treatment program.  Patient  has been instructed in self management of symptoms.  Patient is independent in self management of symptoms.    Kapil continues to require the following intervention to meet STG and LTG's:  PT intervention is no longer required to meet STG/LTG.    Recommendations:  This patient is ready to be discharged from therapy and continue their home treatment program.    Please refer to the daily flowsheet for treatment today, total treatment time and  time spent performing 1:1 timed codes.

## 2019-06-27 ENCOUNTER — TRANSFERRED RECORDS (OUTPATIENT)
Dept: HEALTH INFORMATION MANAGEMENT | Facility: CLINIC | Age: 69
End: 2019-06-27

## 2019-07-03 DIAGNOSIS — I10 HYPERTENSION GOAL BP (BLOOD PRESSURE) < 140/90: ICD-10-CM

## 2019-07-03 RX ORDER — LISINOPRIL/HYDROCHLOROTHIAZIDE 10-12.5 MG
1 TABLET ORAL DAILY
Qty: 90 TABLET | Refills: 3 | Status: SHIPPED | OUTPATIENT
Start: 2019-07-03 | End: 2020-02-24

## 2019-07-03 NOTE — TELEPHONE ENCOUNTER
Prescription approved per [facility] Refill Protocol.  Shital Williamson RN

## 2019-07-03 NOTE — TELEPHONE ENCOUNTER
"Requested Prescriptions   Pending Prescriptions Disp Refills     lisinopril-hydrochlorothiazide (PRINZIDE/ZESTORETIC) 10-12.5 MG tablet 90 tablet 3     Sig: Take 1 tablet by mouth daily   Last Written Prescription Date:  9-7-18  Last Fill Quantity: 90,  # refills: 3   Last office visit: 3/11/2019 with prescribing provider:  3-11-19   Future Office Visit:        Diuretics (Including Combos) Protocol Passed - 7/3/2019  9:45 AM        Passed - Blood pressure under 140/90 in past 12 months     BP Readings from Last 3 Encounters:   04/23/19 118/72   03/11/19 107/69   02/12/19 125/74                 Passed - Recent (12 mo) or future (30 days) visit within the authorizing provider's specialty     Patient had office visit in the last 12 months or has a visit in the next 30 days with authorizing provider or within the authorizing provider's specialty.  See \"Patient Info\" tab in inbasket, or \"Choose Columns\" in Meds & Orders section of the refill encounter.              Passed - Medication is active on med list        Passed - Patient is age 18 or older        Passed - Normal serum creatinine on file in past 12 months     Recent Labs   Lab Test 09/07/18  1044   CR 0.99              Passed - Normal serum potassium on file in past 12 months     Recent Labs   Lab Test 09/07/18  1044   POTASSIUM 4.3                    Passed - Normal serum sodium on file in past 12 months     Recent Labs   Lab Test 09/07/18  1044                   "

## 2019-09-07 PROBLEM — G89.29 CHRONIC RIGHT-SIDED LOW BACK PAIN WITH RIGHT-SIDED SCIATICA: Status: RESOLVED | Noted: 2019-06-14 | Resolved: 2019-09-07

## 2019-09-07 PROBLEM — M54.41 CHRONIC RIGHT-SIDED LOW BACK PAIN WITH RIGHT-SIDED SCIATICA: Status: RESOLVED | Noted: 2019-06-14 | Resolved: 2019-09-07

## 2019-09-07 PROBLEM — S76.219A GROIN STRAIN: Status: RESOLVED | Noted: 2019-06-14 | Resolved: 2019-09-07

## 2019-11-07 PROBLEM — M25.562 CHRONIC PAIN OF LEFT KNEE: Status: RESOLVED | Noted: 2018-09-25 | Resolved: 2019-11-07

## 2019-11-07 PROBLEM — G89.29 CHRONIC PAIN OF LEFT KNEE: Status: RESOLVED | Noted: 2018-09-25 | Resolved: 2019-11-07

## 2019-11-07 PROBLEM — M54.50 CHRONIC LEFT-SIDED LOW BACK PAIN WITHOUT SCIATICA: Status: RESOLVED | Noted: 2018-09-25 | Resolved: 2019-11-07

## 2019-11-07 PROBLEM — M25.521 RIGHT ELBOW PAIN: Status: RESOLVED | Noted: 2018-09-12 | Resolved: 2019-11-07

## 2019-11-07 PROBLEM — G89.29 CHRONIC LEFT-SIDED LOW BACK PAIN WITHOUT SCIATICA: Status: RESOLVED | Noted: 2018-09-25 | Resolved: 2019-11-07

## 2019-11-12 ENCOUNTER — OFFICE VISIT (OUTPATIENT)
Dept: FAMILY MEDICINE | Facility: CLINIC | Age: 69
End: 2019-11-12
Payer: COMMERCIAL

## 2019-11-12 VITALS
SYSTOLIC BLOOD PRESSURE: 124 MMHG | TEMPERATURE: 98.7 F | HEART RATE: 97 BPM | DIASTOLIC BLOOD PRESSURE: 76 MMHG | BODY MASS INDEX: 27.66 KG/M2 | WEIGHT: 190 LBS | OXYGEN SATURATION: 97 %

## 2019-11-12 DIAGNOSIS — B35.1 ONYCHOMYCOSIS: Primary | ICD-10-CM

## 2019-11-12 PROCEDURE — 99213 OFFICE O/P EST LOW 20 MIN: CPT | Performed by: NURSE PRACTITIONER

## 2019-11-12 RX ORDER — TERBINAFINE HYDROCHLORIDE 250 MG/1
250 TABLET ORAL DAILY
Qty: 90 TABLET | Refills: 0 | Status: SHIPPED | OUTPATIENT
Start: 2019-11-12 | End: 2020-02-10

## 2019-11-12 ASSESSMENT — PATIENT HEALTH QUESTIONNAIRE - PHQ9: SUM OF ALL RESPONSES TO PHQ QUESTIONS 1-9: 0

## 2019-11-12 ASSESSMENT — PAIN SCALES - GENERAL: PAINLEVEL: SEVERE PAIN (6)

## 2019-11-12 NOTE — PROGRESS NOTES
Subjective     Kapil Auguste is a 69 year old male who presents to clinic today for the following health issues:    HPI   He complains of intermittent left great toe pain for the past month  He notices the pain when pressure is applied on his great toe  For example, if he dorsiflexes his toes against his shoes, or while on the elliptical at work          Patient Active Problem List   Diagnosis     Advanced directives, counseling/discussion     Adjustment disorder     CARDIOVASCULAR SCREENING; LDL GOAL LESS THAN 130     Hypertension goal BP (blood pressure) < 140/90     Past Surgical History:   Procedure Laterality Date     HYDROCELECTOMY SCROTAL  2013       Social History     Tobacco Use     Smoking status: Former Smoker     Packs/day: 0.50     Years: 30.00     Pack years: 15.00     Types: Cigarettes     Last attempt to quit: 2019     Years since quittin.8     Smokeless tobacco: Never Used   Substance Use Topics     Alcohol use: Yes     Comment: 3 shots per week      Family History   Problem Relation Age of Onset     Diabetes Mother      Hypertension Mother      Heart Disease Father      Hypertension Father      Cancer Paternal Uncle         pamcreatic         Current Outpatient Medications   Medication Sig Dispense Refill     lisinopril-hydrochlorothiazide (PRINZIDE/ZESTORETIC) 10-12.5 MG tablet Take 1 tablet by mouth daily 90 tablet 3     terbinafine (LAMISIL) 250 MG tablet Take 1 tablet (250 mg) by mouth daily 90 tablet 0     traZODone (DESYREL) 50 MG tablet Take 0.5-1 tablets (25-50 mg) by mouth nightly as needed for sleep (Patient not taking: Reported on 2019) 60 tablet 1     valACYclovir (VALTREX) 1000 mg tablet Take 2 tablets (2,000 mg) by mouth 2 times daily 4 tablet 0     BP Readings from Last 3 Encounters:   19 124/76   19 118/72   19 107/69    Wt Readings from Last 3 Encounters:   19 86.2 kg (190 lb)   19 82.1 kg (181 lb)   19 81.6 kg (180 lb)                   Reviewed and updated as needed this visit by Provider         Review of Systems   ROS COMP: Noncontributory except as above      Objective    /76 (BP Location: Right arm, Patient Position: Chair, Cuff Size: Adult Regular)   Pulse 97   Temp 98.7  F (37.1  C) (Oral)   Wt 86.2 kg (190 lb)   SpO2 97%   BMI 27.66 kg/m    Body mass index is 27.66 kg/m .  Physical Exam   GENERAL: healthy, alert and no distress  EXT: Hypertrophic discolored 1st and 2nd nails of the left foot. Surrounding skin intact. No erythema, swelling or warmth. Tenderness under 1st distal nail plate    Diagnostic Test Results:  Labs reviewed in Epic        Assessment & Plan       ICD-10-CM    1. Onychomycosis B35.1 terbinafine (LAMISIL) 250 MG tablet     Hepatic panel (Albumin, ALT, AST, Bili, Alk Phos, TP)        We discussed that onychomycosis can be difficult to treat, and is often cosmetic. For him, his great toe distal nail hypertrophy appears to be causing pain. We will try oral medication. Reviewed risk and benefits of medication. He will have hepatic panel checked in 5 weeks for monitoring    Patient Instructions   Start taking 1 tablet terbinafine daily  If you do not notice an improvement in 4-6 weeks then we can consider this medication ineffective and you can stop  You have a pre-op scheduled in January. I ordered a blood test to check liver enzymes. Please have that done at this time        LEV [facility]

## 2019-11-12 NOTE — PATIENT INSTRUCTIONS
Start taking 1 tablet terbinafine daily  If you do not notice an improvement in 4-6 weeks then we can consider this medication ineffective and you can stop  You have a pre-op scheduled in January. I ordered a blood test to check liver enzymes. Please have that done at this time

## 2019-11-15 ENCOUNTER — MYC MEDICAL ADVICE (OUTPATIENT)
Dept: PEDIATRICS | Facility: CLINIC | Age: 69
End: 2019-11-15

## 2020-02-23 ENCOUNTER — HEALTH MAINTENANCE LETTER (OUTPATIENT)
Age: 70
End: 2020-02-23

## 2020-02-23 ASSESSMENT — ENCOUNTER SYMPTOMS
HEADACHES: 0
SORE THROAT: 0
ARTHRALGIAS: 0
NAUSEA: 0
DYSURIA: 0
PARESTHESIAS: 0
SHORTNESS OF BREATH: 0
CHILLS: 0
COUGH: 0
PALPITATIONS: 0
JOINT SWELLING: 0
HEARTBURN: 0
CONSTIPATION: 0
DIARRHEA: 0
EYE PAIN: 0
FREQUENCY: 0
FEVER: 0
MYALGIAS: 0
ABDOMINAL PAIN: 0
HEMATOCHEZIA: 0
NERVOUS/ANXIOUS: 0
DIZZINESS: 1
HEMATURIA: 0
WEAKNESS: 0

## 2020-02-23 ASSESSMENT — ACTIVITIES OF DAILY LIVING (ADL): CURRENT_FUNCTION: NO ASSISTANCE NEEDED

## 2020-02-24 ENCOUNTER — OFFICE VISIT (OUTPATIENT)
Dept: FAMILY MEDICINE | Facility: CLINIC | Age: 70
End: 2020-02-24
Payer: COMMERCIAL

## 2020-02-24 VITALS
TEMPERATURE: 97.6 F | SYSTOLIC BLOOD PRESSURE: 121 MMHG | DIASTOLIC BLOOD PRESSURE: 76 MMHG | WEIGHT: 178 LBS | HEART RATE: 81 BPM | OXYGEN SATURATION: 98 % | BODY MASS INDEX: 26.36 KG/M2 | HEIGHT: 69 IN

## 2020-02-24 DIAGNOSIS — I10 HYPERTENSION GOAL BP (BLOOD PRESSURE) < 140/90: ICD-10-CM

## 2020-02-24 DIAGNOSIS — Z87.891 FORMER SMOKER: ICD-10-CM

## 2020-02-24 DIAGNOSIS — Z00.00 ENCOUNTER FOR MEDICARE ANNUAL WELLNESS EXAM: Primary | ICD-10-CM

## 2020-02-24 DIAGNOSIS — Z12.2 ENCOUNTER FOR SCREENING FOR LUNG CANCER: ICD-10-CM

## 2020-02-24 LAB
ALBUMIN SERPL-MCNC: 3.6 G/DL (ref 3.4–5)
ALP SERPL-CCNC: 93 U/L (ref 40–150)
ALT SERPL W P-5'-P-CCNC: 25 U/L (ref 0–70)
ANION GAP SERPL CALCULATED.3IONS-SCNC: 3 MMOL/L (ref 3–14)
AST SERPL W P-5'-P-CCNC: 27 U/L (ref 0–45)
BILIRUB SERPL-MCNC: 0.3 MG/DL (ref 0.2–1.3)
BUN SERPL-MCNC: 17 MG/DL (ref 7–30)
CALCIUM SERPL-MCNC: 9.2 MG/DL (ref 8.5–10.1)
CHLORIDE SERPL-SCNC: 110 MMOL/L (ref 94–109)
CHOLEST SERPL-MCNC: 175 MG/DL
CO2 SERPL-SCNC: 27 MMOL/L (ref 20–32)
CREAT SERPL-MCNC: 0.83 MG/DL (ref 0.66–1.25)
GFR SERPL CREATININE-BSD FRML MDRD: 88 ML/MIN/{1.73_M2}
GLUCOSE SERPL-MCNC: 106 MG/DL (ref 70–99)
HDLC SERPL-MCNC: 71 MG/DL
LDLC SERPL CALC-MCNC: 74 MG/DL
NONHDLC SERPL-MCNC: 104 MG/DL
POTASSIUM SERPL-SCNC: 3.9 MMOL/L (ref 3.4–5.3)
PROT SERPL-MCNC: 7.3 G/DL (ref 6.8–8.8)
SODIUM SERPL-SCNC: 140 MMOL/L (ref 133–144)
TRIGL SERPL-MCNC: 149 MG/DL

## 2020-02-24 PROCEDURE — 99397 PER PM REEVAL EST PAT 65+ YR: CPT | Performed by: FAMILY MEDICINE

## 2020-02-24 PROCEDURE — 80061 LIPID PANEL: CPT | Performed by: FAMILY MEDICINE

## 2020-02-24 PROCEDURE — 36415 COLL VENOUS BLD VENIPUNCTURE: CPT | Performed by: FAMILY MEDICINE

## 2020-02-24 PROCEDURE — G0103 PSA SCREENING: HCPCS | Performed by: FAMILY MEDICINE

## 2020-02-24 PROCEDURE — 80053 COMPREHEN METABOLIC PANEL: CPT | Performed by: FAMILY MEDICINE

## 2020-02-24 RX ORDER — LISINOPRIL/HYDROCHLOROTHIAZIDE 10-12.5 MG
1 TABLET ORAL DAILY
Qty: 90 TABLET | Refills: 3 | Status: SHIPPED | OUTPATIENT
Start: 2020-02-24 | End: 2020-11-10

## 2020-02-24 ASSESSMENT — ENCOUNTER SYMPTOMS
SORE THROAT: 0
DIARRHEA: 0
HEMATOCHEZIA: 0
HEARTBURN: 0
ENDOCRINE NEGATIVE: 1
WEAKNESS: 0
CONSTIPATION: 0
JOINT SWELLING: 0
SHORTNESS OF BREATH: 0
NAUSEA: 0
NERVOUS/ANXIOUS: 0
HEMATOLOGIC/LYMPHATIC NEGATIVE: 1
EYE PAIN: 0
PARESTHESIAS: 0
DYSURIA: 0
COUGH: 0
PALPITATIONS: 0
CHILLS: 0
HEADACHES: 0
ALLERGIC/IMMUNOLOGIC NEGATIVE: 1
MYALGIAS: 0
FEVER: 0
HEMATURIA: 0
DIZZINESS: 1
ARTHRALGIAS: 0
ABDOMINAL PAIN: 0
FREQUENCY: 0

## 2020-02-24 ASSESSMENT — PAIN SCALES - GENERAL: PAINLEVEL: NO PAIN (0)

## 2020-02-24 ASSESSMENT — MIFFLIN-ST. JEOR: SCORE: 1552.78

## 2020-02-24 ASSESSMENT — ACTIVITIES OF DAILY LIVING (ADL): CURRENT_FUNCTION: NO ASSISTANCE NEEDED

## 2020-02-24 NOTE — PATIENT INSTRUCTIONS
Patient Education   Personalized Prevention Plan  You are due for the preventive services outlined below.  Your care team is available to assist you in scheduling these services.  If you have already completed any of these items, please share that information with your care team to update in your medical record.  Health Maintenance Due   Topic Date Due     Annual Wellness Visit  09/07/2019

## 2020-02-24 NOTE — PROGRESS NOTES
"SUBJECTIVE:   Kapil Auguste is a 69 year old male who presents for Preventive Visit.  Are you in the first 12 months of your Medicare coverage?  No    Healthy Habits:     In general, how would you rate your overall health?  Excellent    Frequency of exercise:  6-7 days/week    Duration of exercise:  Greater than 60 minutes    Do you usually eat at least 4 servings of fruit and vegetables a day, include whole grains    & fiber and avoid regularly eating high fat or \"junk\" foods?  No    Taking medications regularly:  Yes    Barriers to taking medications:  None    Medication side effects:  None    Ability to successfully perform activities of daily living:  No assistance needed    Home Safety:  No safety concerns identified    Hearing Impairment:  No hearing concerns    In the past 6 months, have you been bothered by leaking of urine?  No    In general, how would you rate your overall mental or emotional health?  Excellent      PHQ-2 Total Score: 0    Additional concerns today:  No    Do you feel safe in your environment? Yes    Have you ever done Advance Care Planning? (For example, a Health Directive, POLST, or a discussion with a medical provider or your loved ones about your wishes): Yes, patient states has an Advance Care Planning document and will bring a copy to the clinic.    Fall risk  Fallen 2 or more times in the past year?: No  Any fall with injury in the past year?: No    Cognitive Screening   1) Repeat 3 items (Leader, Season, Table)    2) Clock draw: NORMAL  3) 3 item recall: Recalls 3 objects  Results: 3 items recalled: COGNITIVE IMPAIRMENT LESS LIKELY    Mini-CogTM Copyright RAND Nuno. Licensed by the author for use in [facility]; reprinted with permission (summer@.[facility]). All rights reserved.      Do you have sleep apnea, excessive snoring or daytime drowsiness?: yes    Reviewed and updated as needed this visit by clinical staff  Tobacco  Allergies  Meds  Med Hx  Surg Hx  Fam Hx "  Soc Hx        Reviewed and updated as needed this visit by Provider        Social History     Tobacco Use     Smoking status: Former Smoker     Packs/day: 0.50     Years: 30.00     Pack years: 15.00     Types: Cigarettes     Last attempt to quit: 2019     Years since quittin.1     Smokeless tobacco: Never Used   Substance Use Topics     Alcohol use: Yes     Comment: 3 shots per week      If you drink alcohol do you typically have >3 drinks per day or >7 drinks per week? No    Alcohol Use 2020   Prescreen: >3 drinks/day or >7 drinks/week? No   Prescreen: >3 drinks/day or >7 drinks/week? -   No flowsheet data found.    Current providers sharing in care for this patient include:   Patient Care Team:  Shadia Foote APRN CNP as PCP - General (Nurse Practitioner - Adult Health)  hSadia Foote APRN CNP as Assigned PCP  Gagandeep Ramirez EMT as Personal Advocate & Liaison (PAL)    The following health maintenance items are reviewed in Epic and correct as of today:  Health Maintenance   Topic Date Due     MEDICARE ANNUAL WELLNESS VISIT  2019     FALL RISK ASSESSMENT  2021     COLONOSCOPY  2022     ADVANCE CARE PLANNING  2023     LIPID  2023     DTAP/TDAP/TD IMMUNIZATION (6 - Td) 2024     PHQ-2  Completed     INFLUENZA VACCINE  Completed     PNEUMOCOCCAL IMMUNIZATION 65+ LOW/MEDIUM RISK  Completed     ZOSTER IMMUNIZATION  Completed     AORTIC ANEURYSM SCREENING (SYSTEM ASSIGNED)  Completed     IPV IMMUNIZATION  Aged Out     MENINGITIS IMMUNIZATION  Aged Out     BP Readings from Last 3 Encounters:   20 121/76   19 124/76   19 118/72    Wt Readings from Last 3 Encounters:   20 80.7 kg (178 lb)   19 86.2 kg (190 lb)   19 82.1 kg (181 lb)                  Patient Active Problem List   Diagnosis     Advanced directives, counseling/discussion     Adjustment disorder     CARDIOVASCULAR SCREENING; LDL GOAL LESS THAN 130      Hypertension goal BP (blood pressure) < 140/90     Past Surgical History:   Procedure Laterality Date     HYDROCELECTOMY SCROTAL  2013       Social History     Tobacco Use     Smoking status: Former Smoker     Packs/day: 0.50     Years: 30.00     Pack years: 15.00     Types: Cigarettes     Last attempt to quit: 2019     Years since quittin.1     Smokeless tobacco: Never Used   Substance Use Topics     Alcohol use: Yes     Comment: 3 shots per week      Family History   Problem Relation Age of Onset     Diabetes Mother      Hypertension Mother      Heart Disease Father      Hypertension Father      Cancer Paternal Uncle         pamcreatic         Current Outpatient Medications   Medication Sig Dispense Refill     lisinopril-hydrochlorothiazide (ZESTORETIC) 10-12.5 MG tablet Take 1 tablet by mouth daily 90 tablet 3     valACYclovir (VALTREX) 1000 mg tablet Take 2 tablets (2,000 mg) by mouth 2 times daily 4 tablet 0     No Known Allergies  Up to date with all immunizations.    Review of Systems   Constitutional: Negative for chills and fever.   HENT: Negative for congestion, ear pain, hearing loss and sore throat.    Eyes: Negative for pain and visual disturbance.   Respiratory: Negative for cough and shortness of breath.    Cardiovascular: Negative for chest pain, palpitations and peripheral edema.   Gastrointestinal: Negative for abdominal pain, constipation, diarrhea, heartburn, hematochezia and nausea.   Endocrine: Negative.    Genitourinary: Negative for discharge, dysuria, frequency, genital sores, hematuria, impotence and urgency.   Musculoskeletal: Negative for arthralgias, joint swelling and myalgias.   Skin: Negative for rash.   Allergic/Immunologic: Negative.    Neurological: Positive for dizziness. Negative for weakness, headaches and paresthesias.   Hematological: Negative.    Psychiatric/Behavioral: Negative for mood changes. The patient is not nervous/anxious.      Constitutional, HEENT,  "cardiovascular, pulmonary, GI, , musculoskeletal, neuro, skin, endocrine and psych systems are negative, except as otherwise noted.    OBJECTIVE:   There were no vitals taken for this visit. Estimated body mass index is 27.66 kg/m  as calculated from the following:    Height as of 3/11/19: 1.765 m (5' 9.5\").    Weight as of 11/12/19: 86.2 kg (190 lb).  Physical Exam  GENERAL: healthy, alert and no distress  HENT: ear canals and TM's normal, nose and mouth without ulcers or lesions  NECK: no adenopathy, no asymmetry, masses, or scars and thyroid normal to palpation  RESP: lungs clear to auscultation - no rales, rhonchi or wheezes  CV: regular rate and rhythm, normal S1 S2, no S3 or S4, no murmur, click or rub, no peripheral edema and peripheral pulses strong  ABDOMEN: soft, nontender, no hepatosplenomegaly, no masses and bowel sounds normal  MS: no gross musculoskeletal defects noted, no edema  SKIN: no suspicious lesions or rashes  NEURO: Normal strength and tone, mentation intact and speech normal  PSYCH: mentation appears normal, affect normal/bright    Diagnostic Test Results:  Labs reviewed in Epic  Orders Placed This Encounter   Procedures     CT Chest Lung Cancer Scrn Low Dose wo     PSA, screen     Comprehensive metabolic panel     Lipid panel reflex to direct LDL Non-fasting       ASSESSMENT / PLAN:       ICD-10-CM    1. Encounter for Medicare annual wellness exam Z00.00 PSA, screen     Comprehensive metabolic panel     Lipid panel reflex to direct LDL Non-fasting     CT Chest Lung Cancer Scrn Low Dose wo   2. Hypertension goal BP (blood pressure) < 140/90 I10 lisinopril-hydrochlorothiazide (ZESTORETIC) 10-12.5 MG tablet   3. Former smoker Z87.891 CT Chest Lung Cancer Scrn Low Dose wo   4. Encounter for screening for lung cancer Z12.2 CT Chest Lung Cancer Scrn Low Dose wo       COUNSELING:  Reviewed preventive health counseling, as reflected in patient instructions       Regular exercise       Healthy " "diet/nutrition       Vision screening       Hearing screening       Dental care       Fall risk prevention       Immunizations    Up To Date  Lung Cancer Screening: Pending    Estimated body mass index is 27.66 kg/m  as calculated from the following:    Height as of 3/11/19: 1.765 m (5' 9.5\").    Weight as of 11/12/19: 86.2 kg (190 lb).         reports that he quit smoking about 13 months ago. His smoking use included cigarettes. He has a 15.00 pack-year smoking history. He has never used smokeless tobacco.      Appropriate preventive services were discussed with this patient, including applicable screening as appropriate for cardiovascular disease, diabetes, osteopenia/osteoporosis, and glaucoma.  As appropriate for age/gender, discussed screening for colorectal cancer, prostate cancer, breast cancer, and cervical cancer. Checklist reviewing preventive services available has been given to the patient.    Reviewed patients plan of care and provided an AVS. The Basic Care Plan (routine screening as documented in Health Maintenance) for Kapil meets the Care Plan requirement. This Care Plan has been established and reviewed with the Patient.    Counseling Resources:  ATP IV Guidelines  Pooled Cohorts Equation Calculator  Breast Cancer Risk Calculator  FRAX Risk Assessment  ICSI Preventive Guidelines  Dietary Guidelines for Americans, 2010  CoreTrace's MyPlate  ASA Prophylaxis  Lung CA Screening    Mehul Darnell MD  [facility]    Identified Health Risks:  "

## 2020-02-25 LAB — PSA SERPL-ACNC: 1.14 UG/L (ref 0–4)

## 2020-02-27 ENCOUNTER — ANCILLARY PROCEDURE (OUTPATIENT)
Dept: CT IMAGING | Facility: CLINIC | Age: 70
End: 2020-02-27
Attending: FAMILY MEDICINE
Payer: COMMERCIAL

## 2020-02-27 DIAGNOSIS — Z12.2 ENCOUNTER FOR SCREENING FOR LUNG CANCER: ICD-10-CM

## 2020-02-27 DIAGNOSIS — Z87.891 FORMER SMOKER: ICD-10-CM

## 2020-02-27 DIAGNOSIS — Z00.00 ENCOUNTER FOR MEDICARE ANNUAL WELLNESS EXAM: ICD-10-CM

## 2020-02-27 PROCEDURE — G0297 LDCT FOR LUNG CA SCREEN: HCPCS | Mod: TC

## 2020-07-02 ENCOUNTER — OFFICE VISIT (OUTPATIENT)
Dept: FAMILY MEDICINE | Facility: CLINIC | Age: 70
End: 2020-07-02
Payer: COMMERCIAL

## 2020-07-02 VITALS
DIASTOLIC BLOOD PRESSURE: 77 MMHG | HEART RATE: 81 BPM | BODY MASS INDEX: 27.32 KG/M2 | WEIGHT: 185 LBS | SYSTOLIC BLOOD PRESSURE: 125 MMHG | TEMPERATURE: 97.5 F | OXYGEN SATURATION: 98 %

## 2020-07-02 DIAGNOSIS — H61.23 BILATERAL IMPACTED CERUMEN: Primary | ICD-10-CM

## 2020-07-02 DIAGNOSIS — H91.93 DECREASED HEARING OF BOTH EARS: ICD-10-CM

## 2020-07-02 PROCEDURE — 99213 OFFICE O/P EST LOW 20 MIN: CPT | Performed by: FAMILY MEDICINE

## 2020-07-02 ASSESSMENT — PAIN SCALES - GENERAL: PAINLEVEL: NO PAIN (0)

## 2020-07-02 NOTE — PROGRESS NOTES
Subjective     Kapil Auguste is a 69 year old male who presents to clinic today for the following health issues:    HPI     Patient reports for the past few weeks he has been feeling fullness, pressure in both of his ears.  He feels his hearing is decreased.  Denies sinus congestion, has no fever, no sneezing no coughing.  No headache, no sore throat, no recent history of travel.    Patient denies using Q-tips to clean his ears.  Patient is here for an ear wash, tried to do it himself, no symptoms.    Matilde Restrepo MA      Patient Active Problem List   Diagnosis     Advanced directives, counseling/discussion     Adjustment disorder     CARDIOVASCULAR SCREENING; LDL GOAL LESS THAN 130     Hypertension goal BP (blood pressure) < 140/90     Past Surgical History:   Procedure Laterality Date     HYDROCELECTOMY SCROTAL         Social History     Tobacco Use     Smoking status: Former Smoker     Packs/day: 0.50     Years: 30.00     Pack years: 15.00     Types: Cigarettes     Last attempt to quit: 2019     Years since quittin.4     Smokeless tobacco: Never Used   Substance Use Topics     Alcohol use: Yes     Comment: 3 shots per week      Family History   Problem Relation Age of Onset     Diabetes Mother      Hypertension Mother      Heart Disease Father      Hypertension Father      Cancer Paternal Uncle         pamcreatic         Current Outpatient Medications   Medication Sig Dispense Refill     lisinopril-hydrochlorothiazide (ZESTORETIC) 10-12.5 MG tablet Take 1 tablet by mouth daily 90 tablet 3     valACYclovir (VALTREX) 1000 mg tablet Take 2 tablets (2,000 mg) by mouth 2 times daily 4 tablet 0     No Known Allergies    Reviewed and updated as needed this visit by Provider         Review of Systems   Constitutional, HEENT, cardiovascular, pulmonary, gi and gu systems are negative, except as otherwise noted.      Objective    There were no vitals taken for this visit.  There is no height or weight  on file to calculate BMI.  Physical Exam   GENERAL: healthy, alert and no distress  HENT: normal cephalic/atraumatic and both ears: occluded with wax and  S/P irrigations: clear, ear canal, normal TM and landmark.  PSYCH: mentation appears normal, affect normal/bright    Diagnostic Test Results:  Labs reviewed in Epic  none         Assessment & Plan       ICD-10-CM    1. Bilateral impacted cerumen  H61.23    2. Decreased hearing of both ears  H91.93      Patient ears were irrigated.  Felt a lot better.    .  Patient was advised to avoid using Q-tip.       There are no Patient Instructions on file for this visit.    Return in about 8 months (around 3/2/2021) for Physical Exam.    Mehul Darnell MD  [facility]

## 2020-10-06 ENCOUNTER — OFFICE VISIT (OUTPATIENT)
Dept: FAMILY MEDICINE | Facility: CLINIC | Age: 70
End: 2020-10-06
Payer: COMMERCIAL

## 2020-10-06 VITALS
WEIGHT: 185 LBS | SYSTOLIC BLOOD PRESSURE: 120 MMHG | OXYGEN SATURATION: 99 % | DIASTOLIC BLOOD PRESSURE: 87 MMHG | TEMPERATURE: 97.8 F | HEART RATE: 93 BPM | HEIGHT: 69 IN | BODY MASS INDEX: 27.4 KG/M2

## 2020-10-06 DIAGNOSIS — Z23 NEED FOR PROPHYLACTIC VACCINATION AND INOCULATION AGAINST INFLUENZA: ICD-10-CM

## 2020-10-06 DIAGNOSIS — L91.8 SKIN TAG: Primary | ICD-10-CM

## 2020-10-06 PROCEDURE — 90662 IIV NO PRSV INCREASED AG IM: CPT | Performed by: FAMILY MEDICINE

## 2020-10-06 PROCEDURE — 90471 IMMUNIZATION ADMIN: CPT | Performed by: FAMILY MEDICINE

## 2020-10-06 PROCEDURE — 99207 PR DROP WITH A PROCEDURE: CPT | Performed by: FAMILY MEDICINE

## 2020-10-06 PROCEDURE — 11200 RMVL SKIN TAGS UP TO&INC 15: CPT | Performed by: FAMILY MEDICINE

## 2020-10-06 ASSESSMENT — MIFFLIN-ST. JEOR: SCORE: 1579.53

## 2020-10-06 ASSESSMENT — PAIN SCALES - GENERAL: PAINLEVEL: NO PAIN (0)

## 2020-10-06 NOTE — PROGRESS NOTES
"Subjective     Kapil Auguste is a 70 year old male who presents to clinic today for the following health issues:    HPI     Patient came in for a possibly removal of a mole on the right side of his chest.   Patient has been having a skin tag, to his right upper chest area he reports been getting bigger in the past years.  Gets more irritated,  especially when he tried to wear a tight T-shirt.  Otherwise, no bleeding no pain.  He reports been getting bigger larger more irritated.      Review of Systems   Constitutional, HEENT, cardiovascular, pulmonary, gi and gu systems are negative, except as otherwise noted.      Objective    There were no vitals taken for this visit.  There is no height or weight on file to calculate BMI.  Physical Exam   GENERAL: healthy, alert and no distress  SKIN: large skin tag, with domed shaped, right upper chest.  PSYCH: mentation appears normal, affect normal/bright            Assessment & Plan     Skin tag  After he obtained a consent from the patient.  I cleaned the area with alcohol swab.  Then I used sharp scissor and a tweezer and I cut the skin tag at the base.    Did use 1- 2 Nitrogen stick, to control the bleeding.  Triple antibiotic Band-Aid were applied.  Patient tolerated procedure well.  - REMOVAL OF SKIN TAGS, FIRST 15    Need for prophylactic vaccination and inoculation against influenza  Given today  - ADMIN INFLUENZA (For MEDICARE Patients ONLY) []     BMI:   Estimated body mass index is 27.32 kg/m  as calculated from the following:    Height as of this encounter: 1.753 m (5' 9\").    Weight as of this encounter: 83.9 kg (185 lb).   Weight management plan: Discussed healthy diet and exercise guidelines         There are no Patient Instructions on file for this visit.    Return in about 6 months (around 4/6/2021).    Mehul Darnell MD  [facility]    "

## 2020-11-10 DIAGNOSIS — I10 HYPERTENSION GOAL BP (BLOOD PRESSURE) < 140/90: ICD-10-CM

## 2020-11-10 RX ORDER — LISINOPRIL/HYDROCHLOROTHIAZIDE 10-12.5 MG
1 TABLET ORAL DAILY
Qty: 90 TABLET | Refills: 2 | Status: SHIPPED | OUTPATIENT
Start: 2020-11-10

## 2021-02-01 ENCOUNTER — MYC MEDICAL ADVICE (OUTPATIENT)
Dept: FAMILY MEDICINE | Facility: CLINIC | Age: 71
End: 2021-02-01

## 2021-02-01 NOTE — TELEPHONE ENCOUNTER
RN replied to patient via [email or web address]. See message for details.     Rosita Morrow RN, BSN, PHN  [facility] [place]

## 2021-02-16 ENCOUNTER — MYC MEDICAL ADVICE (OUTPATIENT)
Dept: FAMILY MEDICINE | Facility: CLINIC | Age: 71
End: 2021-02-16

## 2021-03-01 ENCOUNTER — IMMUNIZATION (OUTPATIENT)
Dept: NURSING | Facility: CLINIC | Age: 71
End: 2021-03-01
Payer: COMMERCIAL

## 2021-03-01 PROCEDURE — 91301 PR COVID VAC MODERNA 100 MCG/0.5 ML IM: CPT

## 2021-03-01 PROCEDURE — 0011A PR COVID VAC MODERNA 100 MCG/0.5 ML IM: CPT

## 2021-03-02 ENCOUNTER — TELEPHONE (OUTPATIENT)
Dept: FAMILY MEDICINE | Facility: CLINIC | Age: 71
End: 2021-03-02

## 2021-03-02 NOTE — TELEPHONE ENCOUNTER
Spoke to patient over the phone. Appointment for wellness exam scheduled with Dr. Darnell for 3/11/2021.  Leslie Braden CMA (AAMA)

## 2021-03-02 NOTE — TELEPHONE ENCOUNTER
Message was sent directly to RN      For some reason this patient requested an appointment through [facility] and it ended up in the [facility], which we are a Physical Therapy clinic and certainly don't have that ability.  If you could please respond to the patient that would be wonderful!     Thanks   Grisel Jimenez   [facility] - formally [facility]   [phone]       Jessika Alexander RN

## 2021-03-29 ENCOUNTER — IMMUNIZATION (OUTPATIENT)
Dept: NURSING | Facility: CLINIC | Age: 71
End: 2021-03-29
Attending: INTERNAL MEDICINE
Payer: COMMERCIAL

## 2021-03-29 PROCEDURE — 0012A PR COVID VAC MODERNA 100 MCG/0.5 ML IM: CPT

## 2021-03-29 PROCEDURE — 91301 PR COVID VAC MODERNA 100 MCG/0.5 ML IM: CPT

## 2021-04-11 ENCOUNTER — HEALTH MAINTENANCE LETTER (OUTPATIENT)
Age: 71
End: 2021-04-11

## 2021-09-26 ENCOUNTER — HEALTH MAINTENANCE LETTER (OUTPATIENT)
Age: 71
End: 2021-09-26

## 2022-05-07 ENCOUNTER — HEALTH MAINTENANCE LETTER (OUTPATIENT)
Age: 72
End: 2022-05-07

## 2023-01-08 ENCOUNTER — HEALTH MAINTENANCE LETTER (OUTPATIENT)
Age: 73
End: 2023-01-08

## 2023-06-02 ENCOUNTER — HEALTH MAINTENANCE LETTER (OUTPATIENT)
Age: 73
End: 2023-06-02

## 2024-06-29 ENCOUNTER — HEALTH MAINTENANCE LETTER (OUTPATIENT)
Age: 74
End: 2024-06-29

## 2024-11-07 PROBLEM — D17.20 LIPOMA OF SHOULDER: Status: ACTIVE | Noted: 2024-10-29

## 2024-11-08 ENCOUNTER — TELEPHONE (OUTPATIENT)
Dept: ORTHOPEDICS | Facility: CLINIC | Age: 74
End: 2024-11-08
Payer: COMMERCIAL

## 2024-11-08 NOTE — TELEPHONE ENCOUNTER
FUTURE VISIT INFORMATION      SURGERY INFORMATION:  Date: 11/22/24  [facility]  Surgeon:  Alexys Galvez MD   Anesthesia Type:  general  Procedure: tumor removal left shoulder   Consult: virtual visit 10/29/24    RECORDS REQUESTED FROM:       Primary Care Provider: [facility]    Pertinent Medical History: hypertension

## 2024-11-08 NOTE — TELEPHONE ENCOUNTER
Pt wants to know after surgery can he drive himself    Could we send this information to you in [email or web address] or would you prefer to receive a phone call?:   Patient would prefer a phone call   Okay to leave a detailed message?: Yes at Cell number on file:    Telephone Information:   Mobile [phone]

## 2024-11-08 NOTE — TELEPHONE ENCOUNTER
Attempted to contact patient, left message to return my call.        PLAN: patient will need a  and someone to stay with him once he is at home for 24 hours.

## 2024-11-11 NOTE — TELEPHONE ENCOUNTER
Attempted to contact patient x 2.  Left message that he does need a , someone to stay with him  for 24 hours, and to return my call.

## 2024-11-14 NOTE — TELEPHONE ENCOUNTER
Attempted to contact patient for surgical education.  Will send pre op surgical education instructions via [employer].

## 2024-11-15 ENCOUNTER — PRE VISIT (OUTPATIENT)
Dept: SURGERY | Facility: CLINIC | Age: 74
End: 2024-11-15

## 2024-11-15 ENCOUNTER — OFFICE VISIT (OUTPATIENT)
Dept: SURGERY | Facility: CLINIC | Age: 74
End: 2024-11-15
Payer: COMMERCIAL

## 2024-11-15 ENCOUNTER — LAB (OUTPATIENT)
Dept: LAB | Facility: CLINIC | Age: 74
End: 2024-11-15
Payer: COMMERCIAL

## 2024-11-15 VITALS
WEIGHT: 184.3 LBS | HEART RATE: 76 BPM | TEMPERATURE: 97.8 F | OXYGEN SATURATION: 96 % | HEIGHT: 69 IN | BODY MASS INDEX: 27.3 KG/M2 | DIASTOLIC BLOOD PRESSURE: 85 MMHG | SYSTOLIC BLOOD PRESSURE: 144 MMHG

## 2024-11-15 DIAGNOSIS — Z01.818 PREOP EXAMINATION: ICD-10-CM

## 2024-11-15 DIAGNOSIS — D17.20 LIPOMA OF SHOULDER: ICD-10-CM

## 2024-11-15 DIAGNOSIS — Z01.818 PREOP EXAMINATION: Primary | ICD-10-CM

## 2024-11-15 LAB
ANION GAP SERPL CALCULATED.3IONS-SCNC: 8 MMOL/L (ref 7–15)
BUN SERPL-MCNC: 15.9 MG/DL (ref 8–23)
CALCIUM SERPL-MCNC: 9.7 MG/DL (ref 8.8–10.4)
CHLORIDE SERPL-SCNC: 107 MMOL/L (ref 98–107)
CREAT SERPL-MCNC: 0.96 MG/DL (ref 0.67–1.17)
EGFRCR SERPLBLD CKD-EPI 2021: 82 ML/MIN/1.73M2
ERYTHROCYTE [DISTWIDTH] IN BLOOD BY AUTOMATED COUNT: 14 % (ref 10–15)
GLUCOSE SERPL-MCNC: 95 MG/DL (ref 70–99)
HCO3 SERPL-SCNC: 27 MMOL/L (ref 22–29)
HCT VFR BLD AUTO: 40.8 % (ref 40–53)
HGB BLD-MCNC: 13.5 G/DL (ref 13.3–17.7)
MCH RBC QN AUTO: 29.2 PG (ref 26.5–33)
MCHC RBC AUTO-ENTMCNC: 33.1 G/DL (ref 31.5–36.5)
MCV RBC AUTO: 88 FL (ref 78–100)
PLATELET # BLD AUTO: 175 10E3/UL (ref 150–450)
POTASSIUM SERPL-SCNC: 4.2 MMOL/L (ref 3.4–5.3)
RBC # BLD AUTO: 4.63 10E6/UL (ref 4.4–5.9)
SODIUM SERPL-SCNC: 142 MMOL/L (ref 135–145)
WBC # BLD AUTO: 7 10E3/UL (ref 4–11)

## 2024-11-15 PROCEDURE — 36415 COLL VENOUS BLD VENIPUNCTURE: CPT | Performed by: PATHOLOGY

## 2024-11-15 PROCEDURE — 85027 COMPLETE CBC AUTOMATED: CPT | Performed by: PATHOLOGY

## 2024-11-15 PROCEDURE — 80048 BASIC METABOLIC PNL TOTAL CA: CPT | Performed by: PATHOLOGY

## 2024-11-15 RX ORDER — BLOOD-GLUCOSE METER
EACH MISCELLANEOUS
COMMUNITY
Start: 2024-07-31

## 2024-11-15 RX ORDER — BLOOD SUGAR DIAGNOSTIC
STRIP MISCELLANEOUS
COMMUNITY
Start: 2024-07-31

## 2024-11-15 RX ORDER — LISINOPRIL 10 MG/1
10 TABLET ORAL EVERY MORNING
COMMUNITY
Start: 2024-09-20

## 2024-11-15 RX ORDER — CEPHALEXIN 500 MG/1
500 CAPSULE ORAL 2 TIMES DAILY
COMMUNITY
Start: 2024-11-13

## 2024-11-15 RX ORDER — AMITRIPTYLINE HYDROCHLORIDE 10 MG/1
10 TABLET ORAL AT BEDTIME
COMMUNITY
Start: 2024-07-18

## 2024-11-15 RX ORDER — METOPROLOL SUCCINATE 50 MG/1
50 TABLET, EXTENDED RELEASE ORAL EVERY MORNING
COMMUNITY

## 2024-11-15 RX ORDER — LATANOPROST 50 UG/ML
SOLUTION/ DROPS OPHTHALMIC
COMMUNITY
Start: 2024-10-28

## 2024-11-15 RX ORDER — LANCETS 33 GAUGE
EACH MISCELLANEOUS
COMMUNITY
Start: 2024-07-31

## 2024-11-15 ASSESSMENT — PAIN SCALES - GENERAL: PAINLEVEL_OUTOF10: NO PAIN (1)

## 2024-11-15 ASSESSMENT — ENCOUNTER SYMPTOMS
SEIZURES: 0
ORTHOPNEA: 0

## 2024-11-15 ASSESSMENT — LIFESTYLE VARIABLES: TOBACCO_USE: 1

## 2024-11-15 ASSESSMENT — COPD QUESTIONNAIRES: COPD: 0

## 2024-11-15 NOTE — H&P
Pre-Operative H & P     CC:  Preoperative exam to assess for increased cardiopulmonary risk while undergoing surgery and anesthesia.    Date of Encounter: 11/15/2024  Primary Care Physician:  No primary care provider on file.     Reason for visit:   Encounter Diagnoses   Name Primary?    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 He was subsequently referred to Dr. Galvez on 10/29/24 and is now scheduled for the above surgery for further management.     History is obtained from the patient and chart review    Hx of abnormal bleeding or anti-platelet use: Denies.       Past Medical History  Past Medical History:   Diagnosis Date    Diabetes mellitus, type 2 (H)     Hiatal hernia     seen on endoscopy 2017    HLD (hyperlipidemia)     HTN (hypertension)        Past Surgical History  Past Surgical History:   Procedure Laterality Date    HYDROCELECTOMY SCROTAL  2013    right inguinal hernia surgery         Prior to Admission Medications  Current Outpatient Medications   Medication Sig Dispense Refill    amitriptyline (ELAVIL) 10 MG tablet Take 10 mg by  mouth at bedtime.      blood glucose monitoring (ONE TOUCH ULTRA 2) meter device kit       cephALEXin (KEFLEX) 500 MG capsule Take 500 mg by mouth 2 times daily.      Lancets (ONETOUCH DELICA PLUS [id number]) MISC       latanoprost (XALATAN) 0.005 % ophthalmic solution INSTILL 1 DROP INTO BOTH EYES ONCFE A DAY AS DIRECTED      lisinopril (ZESTRIL) 10 MG tablet Take 10 mg by mouth every morning.      metFORMIN (GLUCOPHAGE XR) 500 MG 24 hr tablet Take 500 mg by mouth daily (with dinner).      metoprolol succinate ER (TOPROL XL) 50 MG 24 hr tablet Take 50 mg by mouth every morning.      ONETOUCH ULTRA test strip       rosuvastatin (CRESTOR) 10 MG tablet Take 10 mg by mouth daily.         Allergies  No Known Allergies    Social History  Social History     Socioeconomic History    Marital status:      Spouse name: Not on file    Number of children: Not on file    Years of education: Not on file    Highest education level: Not on file   Occupational History    Not on file   Tobacco Use    Smoking status: Former     Current packs/day: 0.00     Average packs/day: 0.5 packs/day for 30.0 years (15.0 ttl pk-yrs)     Types: Cigarettes     Start date: 1989     Quit date: 2019     Years since quittin.8     Passive exposure: Never (per pt)    Smokeless tobacco: Never   Substance and Sexual Activity    Alcohol use: Yes     Comment: 3 shots per week     Drug use: No    Sexual activity: Not Currently     Partners: Female   Other Topics Concern    Parent/sibling w/ CABG, MI or angioplasty before 65F 55M? No   Social History Narrative    Not on file     Social Drivers of Health     Financial Resource Strain: Not on file   Food Insecurity: Not on file   Transportation Needs: Not on file   Physical Activity: Not on file   Stress: Not on file   Social Connections: Not on file   Interpersonal Safety: Not on file   Housing Stability: Not on file       Family History  Family History   Problem Relation Age of Onset     Diabetes Mother     Hypertension Mother     Heart Disease Father     Hypertension Father     Cancer Paternal Uncle         pamcreatic    Anesthesia Reaction No family hx of     Venous thrombosis No family hx of        Review of Systems  The complete review of systems is negative other than noted in the HPI or here.   Anesthesia Evaluation   Pt has had prior anesthetic.     No history of anesthetic complications       ROS/MED HX  ENT/Pulmonary:     (+)     WALE risk factors,  hypertension,         tobacco use, Past use,                    (-) asthma, COPD, sleep apnea and recent URI   Neurologic: Comment: - intermittent left wrist/hand radiculopathy 2/2 lipoma of shoulder    (-) no seizures and no CVA   Cardiovascular:     (+) Dyslipidemia hypertension- -   -  - -                                 Previous cardiac testing   Echo: Date: Results:    Stress Test:  Date: Results:    ECG Reviewed:  Date: 2015 Results:  SR  Cath:  Date: Results:   (-) CAD, CHF, MARTINEZ and orthopnea/PND   METS/Exercise Tolerance: >4 METS Comment: Reports he stays active getting 10,000-20,000 steps per day. Denies any exertional symptoms.    Hematologic:     (+)      anemia,       (-) history of blood clots and history of blood transfusion   Musculoskeletal: Comment: - Shoulder lipoma      GI/Hepatic:     (+)      hiatal hernia,    cholecystitis/cholelithiasis,       (-) GERD   Renal/Genitourinary:  - neg Renal ROS  (-) renal disease   Endo:     (+)  type II DM, Last HgA1c: 6.2, date: 11/11/24, Not using insulin, - not using insulin pump. Normal glucose range: Reports avg BG has been 120,               Psychiatric/Substance Use:  - neg psychiatric ROS     Infectious Disease:  - neg infectious disease ROS  (-) Recent Fever   Malignancy:  - neg malignancy ROS  (-) malignancy   Other:            BP (!) 144/85 (BP Location: Right arm, Patient Position: Sitting, Cuff Size: Adult Regular)   Pulse 76   Temp 97.8  F (36.6  C) (Oral)   Ht 1.753 m (5'  "9\")   Wt 83.6 kg (184 lb 4.8 oz)   SpO2 96%   BMI 27.22 kg/m      Physical Exam   Constitutional: Awake, alert, cooperative, no apparent distress, and appears stated age.  Eyes: Pupils equal, round and reactive to light, extra ocular muscles intact, sclera clear, conjunctiva normal.  HENT: Normocephalic, oral pharynx with moist mucus membranes. No goiter appreciated.   Respiratory: Clear to auscultation bilaterally, no crackles or wheezing.  Cardiovascular: Regular rate and rhythm, normal S1 and S2, and no murmur noted.  Carotids +2, no bruits. No edema. Palpable pulses to radial arteries.   GI: Normal bowel sounds, soft, non-distended, non-tender, no masses palpated, no hepatosplenomegaly.    Lymph/Hematologic: No cervical lymphadenopathy and no supraclavicular lymphadenopathy.  Genitourinary:  Deferred.   Skin: Warm and dry.    Musculoskeletal: Full ROM of neck. There is no redness, warmth, or swelling of the joints. Gross motor strength is normal.    Neurologic: Awake, alert, oriented to name, place and time. Cranial nerves II-XII are grossly intact. Gait is normal.   Neuropsychiatric: Calm, cooperative. Normal affect.     Prior Labs/Diagnostic Studies   All labs and imaging personally reviewed     EKG/ stress test - if available please see in ROS above       The patient's records and results personally reviewed by this provider.     Outside records reviewed from: Care Everywhere    LAB/DIAGNOSTIC STUDIES TODAY:  CBC, BMP  Component      Latest Ref Rng 11/15/2024  9:33 AM   Sodium      135 - 145 mmol/L 142    Potassium      3.4 - 5.3 mmol/L 4.2    Chloride      98 - 107 mmol/L 107    Carbon Dioxide (CO2)      22 - 29 mmol/L 27    Anion Gap      7 - 15 mmol/L 8    Urea Nitrogen      8.0 - 23.0 mg/dL 15.9    Creatinine      0.67 - 1.17 mg/dL 0.96    GFR Estimate      >60 mL/min/1.73m2 82    Calcium      8.8 - 10.4 mg/dL 9.7    Glucose      70 - 99 mg/dL 95    WBC      4.0 - 11.0 10e3/uL 7.0    RBC Count      " "4.40 - 5.90 10e6/uL 4.63    Hemoglobin      13.3 - 17.7 g/dL 13.5    Hematocrit      40.0 - 53.0 % 40.8    MCV      78 - 100 fL 88    MCH      26.5 - 33.0 pg 29.2    MCHC      31.5 - 36.5 g/dL 33.1    RDW      10.0 - 15.0 % 14.0    Platelet Count      150 - 450 10e3/uL 175          Assessment    Kapil Auguste is a 74 year old male seen as a PAC referral for risk assessment and optimization for anesthesia.    Plan/Recommendations  Pt will be optimized for the proposed procedure.  See below for details on the assessment, risk, and preoperative recommendations    NEUROLOGY  - No history of TIA, CVA or seizure  -Post Op delirium risk factors:  Age  - intermittent left wrist/hand radiculopathy 2/2 lipoma of shoulder     ENT  - No current airway concerns.  Will need to be reassessed day of surgery.  Mallampati: II  TM: > 3  - Upper and lower dentures    CARDIAC  - No history of CAD and Afib Patient is able to achieve > 4 METS and denies any symptoms of CP, chest tightness, or SOB.  - Hypertension  Well controlled  Hold lisinopril on DOS.   - Hyperlipidemia  Well controlled on home regimen    - METS (Metabolic Equivalents)  Patient performs 4 or more METS exercise without symptoms             Total Score: 0      RCRI-Very low risk: Class 1 0.4% complication rate             Total Score: 0        PULMONARY  WALE Medium Risk             Total Score: 3    WALE: Hypertension    WALE: Over 50 ys old    WALE: Male      - Denies asthma or inhaler use  - Tobacco History    History   Smoking Status    Former    Types: Cigarettes   Smokeless Tobacco    Never       GI  - Hiatal hernia seen on endoscopy in 2  PONV Medium Risk  Total Score: 2           1 AN PONV: Patient is not a current smoker    1 AN PONV: Intended Post Op Opioids        /RENAL  - Baseline Creatinine  0.8    ENDOCRINE    - BMI: Estimated body mass index is 27.22 kg/m  as calculated from the following:    Height as of this encounter: 1.753 m (5' 9\").    Weight as " of this encounter: 83.6 kg (184 lb 4.8 oz).  Overweight (BMI 25.0-29.9)  - Diabetes  Diabetes Mellitus, Type II, non-insulin dependent.  Hold morning oral hypoglycemic medications. Recommend close monitoring of the patient's blood glucose levels throughout the perioperative period.    HEME  VTE Low Risk 0.5%             Total Score: 3    VTE: Greater than 59 yrs old    VTE: Male      - No history of abnormal bleeding or antiplatelet use.    MSK  Patient is NOT Frail             Total Score: 0      - Shoulder lipoma (see HPI) - surgery scheduled as above.     DERM  - Patient has a skin lesion removed off his right heel on 11/13/24 via dermatology clinic. Denies any current signs of infection. Was prophylactically started on a 2 week course of keflex by dermatology - continue leading up to DOS.       Different anesthesia methods/types have been discussed with the patient, but they are aware that the final plan will be decided by the assigned anesthesia provider on the date of service.    The patient is optimized for their procedure. AVS with information on surgery time/arrival time, meds and NPO status given by nursing staff. No further diagnostic testing indicated.      On the day of service:     Prep time: 10 minutes  Visit time: 13 minutes  Documentation time: 10 minutes  ------------------------------------------  Total time: 33 minutes      LEV Hernandez CNP  Preoperative Assessment Center  [facility]  Clinic and Surgery Center  Phone: [phone]  Fax: [phone]

## 2024-11-15 NOTE — PATIENT INSTRUCTIONS
Preparing for Your Surgery      Name:  Kapil Auguste   MRN:  [id number]   :  1950   Today's Date:  11/15/2024         Arriving for surgery:  Surgery date:  24  Arrival time:  9.30AM    Restrictions due to COVID 19:    Please maintain social distance.  Masking is optional.      parking is available for anyone with mobility limitations or disabilities. (Monday- Friday 7 am- 5 pm)    Please come to:    [facility]  [address] [place] [id number]    Please check in on the 5th floor at the Ambulatory Surgery Center.      What can I eat or drink?    -  You may eat and drink normally until 8 hours prior to arrival  time. (Until 1.30AM)  -  You may have clear liquids until 2 hours prior to arrival  time. (Until 7.30AM)    Examples of clear liquids:  Water  Clear broth  Juices (apple, white grape, white cranberry  and cider) without pulp  Noncarbonated, powder based beverages  (lemonade and Cash-Aid)  Sodas (Sprite, 7-Up, ginger ale and seltzer)  Coffee or tea (without milk or cream)  Gatorade      Which medicines can I take?    Hold Aspirin for 7 days before surgery.   Hold Multivitamins for 7 days before surgery.  Hold Supplements for 7 days before surgery.  Hold Ibuprofen (Advil, Motrin) for 1 day before surgery--unless otherwise directed by surgeon.  Hold Naproxen (Aleve) for 4 days before surgery.    No alcohol or cannabis products for 24 hours prior to procedure.      -  DO NOT take the following medications the day of surgery:  Lisinopril-hydrochlorothiazide (Zestoretic)  Metformin    -  PLEASE TAKE the following medications the day of surgery:   Rosuvastatin (Crestor)  Metoprolol  Keflex antibiotic     How do I prepare myself?  - Please take 2 showers (one the night prior to surgery and one the morning of surgery) using Scrubcare or Hibiclens soap.    Use this soap only from the neck to your toes. Avoid genital area      Leave the soap on your skin for one  minute--then rinse thoroughly.      You may use your own shampoo and conditioner. No other hair products.   - Please remove all jewelry and body piercings.  - No lotions, deodorants or fragrance.  - No makeup or fingernail polish.   - Bring your ID and insurance card.    -If you have a Deep Brain Stimulator, a Spinal Cord Stimulator, or any implanted Neuro Device, you must bring the remote to the Surgery Center.         ALL PATIENTS ARE REQUIRED TO HAVE A RESPONSIBLE ADULT TO DRIVE AND BE IN ATTENDANCE WITH THEM FOR 24 HOURS FOLLOWING SURGERY.     Covid testing policy as of 12/06/2022  Your surgeon will notify and schedule you for a COVID test if one is needed before surgery--please direct any questions or COVID symptoms to your surgeon      Questions or Concerns:    -For questions regarding the day of surgery, please contact the Ambulatory Surgery Center at [phone].    -If you have health changes between today and your surgery, please contact your surgeon.     - For questions after surgery, please contact your surgeon's office.

## 2024-11-22 ENCOUNTER — HOSPITAL ENCOUNTER (OUTPATIENT)
Facility: AMBULATORY SURGERY CENTER | Age: 74
Discharge: HOME | End: 2024-11-22
Attending: ORTHOPAEDIC SURGERY
Payer: COMMERCIAL

## 2024-11-22 VITALS
TEMPERATURE: 97.1 F | HEART RATE: 81 BPM | WEIGHT: 178 LBS | RESPIRATION RATE: 20 BRPM | SYSTOLIC BLOOD PRESSURE: 183 MMHG | BODY MASS INDEX: 26.36 KG/M2 | OXYGEN SATURATION: 95 % | DIASTOLIC BLOOD PRESSURE: 83 MMHG | HEIGHT: 69 IN

## 2024-11-22 DIAGNOSIS — D17.20 LIPOMA OF SHOULDER: Primary | ICD-10-CM

## 2024-11-22 LAB — GLUCOSE BLDC GLUCOMTR-MCNC: 99 MG/DL (ref 70–99)

## 2024-11-22 PROCEDURE — 82962 GLUCOSE BLOOD TEST: CPT | Mod: GZ | Performed by: PATHOLOGY

## 2024-11-22 PROCEDURE — 88342 IMHCHEM/IMCYTCHM 1ST ANTB: CPT | Mod: 26 | Performed by: STUDENT IN AN ORGANIZED HEALTH CARE EDUCATION/TRAINING PROGRAM

## 2024-11-22 PROCEDURE — 88341 IMHCHEM/IMCYTCHM EA ADD ANTB: CPT | Mod: TC | Performed by: ORTHOPAEDIC SURGERY

## 2024-11-22 PROCEDURE — 88342 IMHCHEM/IMCYTCHM 1ST ANTB: CPT | Mod: TC | Performed by: ORTHOPAEDIC SURGERY

## 2024-11-22 PROCEDURE — 88341 IMHCHEM/IMCYTCHM EA ADD ANTB: CPT | Mod: 26 | Performed by: STUDENT IN AN ORGANIZED HEALTH CARE EDUCATION/TRAINING PROGRAM

## 2024-11-22 PROCEDURE — 88304 TISSUE EXAM BY PATHOLOGIST: CPT | Mod: 26 | Performed by: STUDENT IN AN ORGANIZED HEALTH CARE EDUCATION/TRAINING PROGRAM

## 2024-11-22 RX ORDER — ONDANSETRON 4 MG/1
4 TABLET, ORALLY DISINTEGRATING ORAL EVERY 30 MIN PRN
Status: DISCONTINUED | OUTPATIENT
Start: 2024-11-22 | End: 2024-11-22 | Stop reason: HOSPADM

## 2024-11-22 RX ORDER — ACETAMINOPHEN 325 MG/1
975 TABLET ORAL ONCE
Status: COMPLETED | OUTPATIENT
Start: 2024-11-22 | End: 2024-11-22

## 2024-11-22 RX ORDER — AMOXICILLIN 250 MG
1-2 CAPSULE ORAL 2 TIMES DAILY
Qty: 30 TABLET | Refills: 0 | Status: SHIPPED | OUTPATIENT
Start: 2024-11-22

## 2024-11-22 RX ORDER — CEFAZOLIN SODIUM 2 G/50ML
2 SOLUTION INTRAVENOUS
Status: COMPLETED | OUTPATIENT
Start: 2024-11-22 | End: 2024-11-22

## 2024-11-22 RX ORDER — HYDROMORPHONE HYDROCHLORIDE 1 MG/ML
0.2 INJECTION, SOLUTION INTRAMUSCULAR; INTRAVENOUS; SUBCUTANEOUS EVERY 5 MIN PRN
Status: DISCONTINUED | OUTPATIENT
Start: 2024-11-22 | End: 2024-11-22 | Stop reason: HOSPADM

## 2024-11-22 RX ORDER — OXYCODONE HYDROCHLORIDE 5 MG/1
5 TABLET ORAL EVERY 6 HOURS PRN
Qty: 8 TABLET | Refills: 0 | Status: SHIPPED | OUTPATIENT
Start: 2024-11-22

## 2024-11-22 RX ORDER — SODIUM CHLORIDE, SODIUM LACTATE, POTASSIUM CHLORIDE, CALCIUM CHLORIDE 600; 310; 30; 20 MG/100ML; MG/100ML; MG/100ML; MG/100ML
INJECTION, SOLUTION INTRAVENOUS CONTINUOUS
Status: DISCONTINUED | OUTPATIENT
Start: 2024-11-22 | End: 2024-11-22 | Stop reason: HOSPADM

## 2024-11-22 RX ORDER — CEFAZOLIN SODIUM 2 G/50ML
2 SOLUTION INTRAVENOUS SEE ADMIN INSTRUCTIONS
Status: DISCONTINUED | OUTPATIENT
Start: 2024-11-22 | End: 2024-11-22 | Stop reason: HOSPADM

## 2024-11-22 RX ORDER — ONDANSETRON 2 MG/ML
4 INJECTION INTRAMUSCULAR; INTRAVENOUS EVERY 30 MIN PRN
Status: DISCONTINUED | OUTPATIENT
Start: 2024-11-22 | End: 2024-11-22 | Stop reason: HOSPADM

## 2024-11-22 RX ORDER — DEXAMETHASONE SODIUM PHOSPHATE 10 MG/ML
4 INJECTION, SOLUTION INTRAMUSCULAR; INTRAVENOUS
Status: DISCONTINUED | OUTPATIENT
Start: 2024-11-22 | End: 2024-11-22 | Stop reason: HOSPADM

## 2024-11-22 RX ORDER — BUPIVACAINE HYDROCHLORIDE AND EPINEPHRINE 2.5; 5 MG/ML; UG/ML
INJECTION, SOLUTION INFILTRATION; PERINEURAL PRN
Status: DISCONTINUED | OUTPATIENT
Start: 2024-11-22 | End: 2024-11-22 | Stop reason: HOSPADM

## 2024-11-22 RX ORDER — DEXAMETHASONE SODIUM PHOSPHATE 10 MG/ML
4 INJECTION, SOLUTION INTRAMUSCULAR; INTRAVENOUS
Status: DISCONTINUED | OUTPATIENT
Start: 2024-11-22 | End: 2024-11-23 | Stop reason: HOSPADM

## 2024-11-22 RX ORDER — OXYCODONE HYDROCHLORIDE 5 MG/1
5 TABLET ORAL
Status: DISCONTINUED | OUTPATIENT
Start: 2024-11-22 | End: 2024-11-23 | Stop reason: HOSPADM

## 2024-11-22 RX ORDER — OXYCODONE HYDROCHLORIDE 5 MG/1
10 TABLET ORAL
Status: DISCONTINUED | OUTPATIENT
Start: 2024-11-22 | End: 2024-11-23 | Stop reason: HOSPADM

## 2024-11-22 RX ORDER — ONDANSETRON 4 MG/1
4 TABLET, ORALLY DISINTEGRATING ORAL EVERY 30 MIN PRN
Status: DISCONTINUED | OUTPATIENT
Start: 2024-11-22 | End: 2024-11-23 | Stop reason: HOSPADM

## 2024-11-22 RX ORDER — FENTANYL CITRATE 50 UG/ML
25 INJECTION, SOLUTION INTRAMUSCULAR; INTRAVENOUS EVERY 5 MIN PRN
Status: DISCONTINUED | OUTPATIENT
Start: 2024-11-22 | End: 2024-11-22 | Stop reason: HOSPADM

## 2024-11-22 RX ORDER — ONDANSETRON 2 MG/ML
4 INJECTION INTRAMUSCULAR; INTRAVENOUS EVERY 30 MIN PRN
Status: DISCONTINUED | OUTPATIENT
Start: 2024-11-22 | End: 2024-11-23 | Stop reason: HOSPADM

## 2024-11-22 RX ORDER — NALOXONE HYDROCHLORIDE 0.4 MG/ML
0.1 INJECTION, SOLUTION INTRAMUSCULAR; INTRAVENOUS; SUBCUTANEOUS
Status: DISCONTINUED | OUTPATIENT
Start: 2024-11-22 | End: 2024-11-22 | Stop reason: HOSPADM

## 2024-11-22 RX ORDER — ACETAMINOPHEN 325 MG/1
650 TABLET ORAL
Status: DISCONTINUED | OUTPATIENT
Start: 2024-11-22 | End: 2024-11-23 | Stop reason: HOSPADM

## 2024-11-22 RX ORDER — HYDROMORPHONE HYDROCHLORIDE 1 MG/ML
0.4 INJECTION, SOLUTION INTRAMUSCULAR; INTRAVENOUS; SUBCUTANEOUS EVERY 5 MIN PRN
Status: DISCONTINUED | OUTPATIENT
Start: 2024-11-22 | End: 2024-11-22 | Stop reason: HOSPADM

## 2024-11-22 RX ORDER — ACETAMINOPHEN 325 MG/1
650 TABLET ORAL EVERY 4 HOURS PRN
Qty: 50 TABLET | Refills: 0 | Status: SHIPPED | OUTPATIENT
Start: 2024-11-22

## 2024-11-22 RX ORDER — LIDOCAINE 40 MG/G
CREAM TOPICAL
Status: DISCONTINUED | OUTPATIENT
Start: 2024-11-22 | End: 2024-11-22 | Stop reason: HOSPADM

## 2024-11-22 RX ORDER — NALOXONE HYDROCHLORIDE 0.4 MG/ML
0.1 INJECTION, SOLUTION INTRAMUSCULAR; INTRAVENOUS; SUBCUTANEOUS
Status: DISCONTINUED | OUTPATIENT
Start: 2024-11-22 | End: 2024-11-23 | Stop reason: HOSPADM

## 2024-11-22 RX ORDER — FENTANYL CITRATE 50 UG/ML
50 INJECTION, SOLUTION INTRAMUSCULAR; INTRAVENOUS EVERY 5 MIN PRN
Status: DISCONTINUED | OUTPATIENT
Start: 2024-11-22 | End: 2024-11-22 | Stop reason: HOSPADM

## 2024-11-22 RX ADMIN — Medication 250 ML: at 10:06

## 2024-11-22 RX ADMIN — ACETAMINOPHEN 975 MG: 325 TABLET ORAL at 10:04

## 2024-11-22 NOTE — DISCHARGE INSTRUCTIONS
[facility]  Home Care Following Anesthesia  For 24 hours after surgery:  Get plenty of rest.  A responsible adult must stay with you for at least 24 hours after you leave the surgery center.  Do not drive or use heavy equipment.  If you have weakness or tingling, don't drive or use heavy equipment until this feeling goes away.   Do not drink alcohol.   Avoid strenuous or risky activities.  Ask for help when climbing stairs.  You may feel lightheaded.  IF so, sit for a few minutes before standing.  Have someone help you get up.   If you have nausea (feel sick to your stomach): Drink only clear liquids such as apple juice, ginger ale, broth or 7-Up.  Rest may also help.  Be sure to drink enough fluids.  Move to a regular diet as you feel able.   You may have a slight fever.  Call the doctor if your fever is over 100 F (37.7 C) (taken under the tongue) or lasts longer than 24 hours.  You may have a dry mouth, a sore throat, muscle aches or trouble sleeping. These should go away after 24 hours.  Do not make important or legal decisions.   It is recommended to avoid smoking.               Tips for taking pain medications  To get the best pain relief possible, remember these points:  Take pain medications as directed, before pain becomes severe.  Pain medication can upset your stomach: taking it with food may help.  Constipation is a common side effect of pain medication. Drink plenty of  fluids.  Eat foods high in fiber. Take a stool softener if recommended by your doctor or pharmacist.  Do not drink alcohol, drive or operate machinery while taking pain medications.  Ask about other ways to control pain, such as with heat, ice or relaxation.    Tylenol/Acetaminophen Consumption    If you feel your pain relief is insufficient, you may take Tylenol/Acetaminophen in addition to your narcotic pain medication.   Be careful not to exceed 4,000 mg of Tylenol/Acetaminophen in a 24 hour  period from all sources.  If you are taking extra strength Tylenol/acetaminophen (500 mg), the maximum dose is 8 tablets in 24 hours.  If you are taking regular strength acetaminophen (325 mg), the maximum dose is 12 tablets in 24 hours.    Call a doctor for any of the following:  Signs of infection (fever, growing tenderness at the surgery site, a large amount of drainage or bleeding, severe pain, foul-smelling drainage, redness, swelling).  It has been over 8 to 10 hours since surgery and you are still not able to urinate (pass water).  Headache for over 24 hours.  Numbness, tingling or weakness the day after surgery (if you had spinal anesthesia).  Signs of Covid-19 infection (temperature over 100 degrees, shortness of breath, cough, loss of taste/smell, generalized body aches, persistent headache, chills, sore throat, nausea/vomiting/diarrhea)  Your doctor is:  Dr. Alexys Galvez, Orthopaedics: [phone]                    Or dial [phone] and ask for the resident on call for:  Orthopaedics  For emergency care, call the:  [facility]:  [phone] (TTY for hearing impaired: [phone])

## 2024-11-22 NOTE — BRIEF OP NOTE
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 Galvez/FATUMA for wound check

## 2024-11-22 NOTE — OP NOTE
Preop diagnosis: Tumor left shoulder.    Postoperative diagnosis: Same    Procedure performed removal of tumor left shoulder, deep, 6 cm, benign    Surgeon: Alexys Galvez and Misty Moss.    Estimated blood loss: 2 cc    Pathology submitted: Tumor left shoulder in formalin.    Patient is interviewed in the preoperative area.  Risk and benefits were reviewed.  Consent was signed.  The surgical site was marked with my initials aligned and intended incision.    Preoperative briefing been performed.  The patient was taken the operating room received a general anesthetic and in a supine position the left shoulder was prepped and draped sterilely.  Surgical timeout was performed.    The skin was infiltrated with quarter percent Marcaine and epinephrine.  A 3 inch incision was made along the superior aspect of the lateral left shoulder sharp dissection was taken down through skin and subcutaneous tissue.  Superficial fascia of the deltoid muscle was sharply incised.  The muscle fibers of the deltoid were bluntly spread the tumor was encountered.  The capsule was incised.  Blunt dissection was performed circumferentially.  The attachment of the tumor to the superficial fascia was incised and the tumor was removed from the wound.    The wound was irrigated and closed in a standard fashion.  Postoperative debrief was performed.    Postoperative plan: 1.  The results of the biopsy will be in the MyChart.  The patient is scheduled to be seen back for a postoperative wound check in approximately 2 weeks.

## 2024-12-05 ENCOUNTER — VIRTUAL VISIT (OUTPATIENT)
Dept: ORTHOPEDICS | Facility: CLINIC | Age: 74
End: 2024-12-05
Payer: COMMERCIAL

## 2024-12-05 VITALS
DIASTOLIC BLOOD PRESSURE: 88 MMHG | WEIGHT: 173 LBS | SYSTOLIC BLOOD PRESSURE: 138 MMHG | BODY MASS INDEX: 25.62 KG/M2 | HEIGHT: 69 IN

## 2024-12-05 DIAGNOSIS — D17.20 LIPOMA OF SHOULDER: Primary | ICD-10-CM

## 2024-12-05 ASSESSMENT — PAIN SCALES - GENERAL: PAINLEVEL_OUTOF10: NO PAIN (0)

## 2024-12-05 NOTE — PROGRESS NOTES
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    Plan: Follow up as needed    Diagnosis: Left shoulder spindle cell lipoma.    Treatment: Surgical excision November 2024    I interviewed and examined Mr. Auguste virtually.  He is pleased with his care and is doing well.  He has showered over his wound and has near full motion of the left shoulder.  Interestingly he reports that his ulnar nerve symptoms in the ipsilateral hand have improved since surgery.  I told him there was not a clear anatomic explanation for this but it is of course possible that is related to his tumor removal    All his questions were answered.  Will see him back as needed    This is a virtual visit that began at 3:20 PM and ended at 3:32 PM.  It was a postoperative visit

## 2024-12-05 NOTE — PATIENT INSTRUCTIONS
Impression: Doing well now 2 weeks post excision of spindle cell lipoma left shoulder.    Plan: Follow up as needed

## 2024-12-05 NOTE — LETTER
12/5/2024      Kapil Auguste  [address]  [place] [address]      Dear Colleague,    Thank you for referring your patient, Kapil Auguste, to the [facility]. Please see a copy of my visit note below.    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    Plan: Follow up as needed    Diagnosis: Left shoulder spindle cell lipoma.    Treatment: Surgical excision November 2024    I interviewed and examined Mr. Auguste virtually.  He is pleased with his care and is doing well.  He has showered over his wound and has near full motion of the left shoulder.  Interestingly he reports that his ulnar nerve symptoms in the ipsilateral hand have improved since surgery.  I told him there was not a clear anatomic explanation for this but it is of course possible that is related to his tumor removal    All his questions were answered.  Will see him back as needed    This is a virtual visit that began at 3:20 PM and ended at 3:32 PM.  It was a postoperative visit      Again, thank you for allowing me to participate in the care of your patient.        Sincerely,        Alexys Galvez MD

## 2024-12-05 NOTE — NURSING NOTE
Current patient location: [facility] [facility] [address]   [facility] [address]    Is the patient currently in the state of [place]? YES    Visit mode:VIDEO    If the visit is dropped, the patient can be reconnected by:VIDEO VISIT: Text to cell phone:   Telephone Information:   Mobile [phone]    and VIDEO VISIT: Send to e-mail at: [email or web address]    Will anyone else be joining the visit? NO  (If patient encounters technical issues they should call [phone])    Are changes needed to the allergy or medication list? No    Patient denies any changes since echeck-in completion and states all information entered during echeck-in remains accurate.    Are refills needed on medications prescribed by this physician? NO    Rooming Documentation:  Questionnaire(s) completed    Reason for visit: WILLIAMS Carmona MA VVF

## 2025-08-24 ENCOUNTER — HEALTH MAINTENANCE LETTER (OUTPATIENT)
Age: 75
End: 2025-08-24

## (undated) DEVICE — SYR BULB IRRIG DOVER 60 ML LATEX FREE 67000

## (undated) DEVICE — DRAPE STERI U 1015

## (undated) DEVICE — NDL 25GA 1.5" 305127

## (undated) DEVICE — SYR 10ML FINGER CONTROL W/O NDL 309695

## (undated) DEVICE — SUCTION MANIFOLD NEPTUNE 2 SYS 1 PORT 702-025-000

## (undated) DEVICE — ESU GROUND PAD ADULT W/CORD E7507

## (undated) DEVICE — PREP CHLORAPREP 26ML TINTED HI-LITE ORANGE 930815

## (undated) DEVICE — SU VICRYL 2-0 CT-2 27" UND J269H

## (undated) DEVICE — PACK SHOULDER CUSTOM ASC SOP15ASUMA

## (undated) DEVICE — GLOVE BIOGEL PI MICRO INDICATOR UNDERGLOVE SZ 7.5 48975

## (undated) DEVICE — GLOVE BIOGEL PI MICRO SZ 7.5 48575

## (undated) DEVICE — BLADE KNIFE SURG 15 371115

## (undated) DEVICE — SOL NACL 0.9% IRRIG 500ML BOTTLE 2F7123

## (undated) DEVICE — GLOVE BIOGEL PI ULTRATOUCH SZ 7.0 41170

## (undated) DEVICE — GLOVE BIOGEL PI MICRO INDICATOR UNDERGLOVE SZ 8.0 48980

## (undated) DEVICE — ESU PENCIL SMOKE EVAC W/ROCKER SWITCH 0703-047-000

## (undated) DEVICE — COVER CAMERA IN-LIGHT DISP LT-C02

## (undated) DEVICE — SU PDS II 3-0 PS-2 18" Z497G

## (undated) DEVICE — DRAPE SHOULDER PACK SPLITS 29365

## (undated) DEVICE — BLADE KNIFE SURG 10 371110

## (undated) DEVICE — LINEN ORTHO PACK 5446

## (undated) DEVICE — SU MONOCRYL 4-0 PS-2 27" UND Y426H

## (undated) RX ORDER — ONDANSETRON 2 MG/ML
INJECTION INTRAMUSCULAR; INTRAVENOUS
Status: DISPENSED
Start: 2024-11-22

## (undated) RX ORDER — CEFAZOLIN SODIUM 2 G/50ML
SOLUTION INTRAVENOUS
Status: DISPENSED
Start: 2024-11-22

## (undated) RX ORDER — ACETAMINOPHEN 325 MG/1
TABLET ORAL
Status: DISPENSED
Start: 2024-11-22

## (undated) RX ORDER — DEXAMETHASONE SODIUM PHOSPHATE 4 MG/ML
INJECTION, SOLUTION INTRA-ARTICULAR; INTRALESIONAL; INTRAMUSCULAR; INTRAVENOUS; SOFT TISSUE
Status: DISPENSED
Start: 2024-11-22

## (undated) RX ORDER — PROPOFOL 10 MG/ML
INJECTION, EMULSION INTRAVENOUS
Status: DISPENSED
Start: 2024-11-22

## (undated) RX ORDER — FENTANYL CITRATE 50 UG/ML
INJECTION, SOLUTION INTRAMUSCULAR; INTRAVENOUS
Status: DISPENSED
Start: 2024-11-22